# Patient Record
Sex: FEMALE | Race: WHITE | NOT HISPANIC OR LATINO | Employment: FULL TIME | URBAN - METROPOLITAN AREA
[De-identification: names, ages, dates, MRNs, and addresses within clinical notes are randomized per-mention and may not be internally consistent; named-entity substitution may affect disease eponyms.]

---

## 2020-09-01 ENCOUNTER — ROUTINE PRENATAL (OUTPATIENT)
Dept: OBSTETRICS AND GYNECOLOGY | Facility: CLINIC | Age: 35
End: 2020-09-01

## 2020-09-01 VITALS
WEIGHT: 146 LBS | DIASTOLIC BLOOD PRESSURE: 72 MMHG | HEIGHT: 65 IN | BODY MASS INDEX: 24.32 KG/M2 | SYSTOLIC BLOOD PRESSURE: 120 MMHG

## 2020-09-01 DIAGNOSIS — Z34.02 ENCOUNTER FOR SUPERVISION OF NORMAL FIRST PREGNANCY IN SECOND TRIMESTER: Primary | ICD-10-CM

## 2020-09-01 LAB
GLUCOSE UR STRIP-MCNC: NEGATIVE MG/DL
PROT UR STRIP-MCNC: NEGATIVE MG/DL

## 2020-09-01 PROCEDURE — 0502F SUBSEQUENT PRENATAL CARE: CPT | Performed by: NURSE PRACTITIONER

## 2020-09-01 RX ORDER — PRENATAL VIT/IRON FUM/FOLIC AC 27MG-0.8MG
TABLET ORAL DAILY
COMMUNITY

## 2020-09-01 NOTE — PROGRESS NOTES
"OB FOLLOW UP    Meaghan Erazo is a 34 y.o.  14w4d patient being seen today for her obstetrical follow up visit. Patient reports increased anxiety due to recent unexpected death of her father by heart attack. She does not want to take any anti anxiety medication now but may need something if continues to have increased anxiety.     Her prenatal care is complicated by (and status) : anxiety      ROS -      Fetal Movement : Too early  Vaginal bleeding denies  Cramping/Contractions denies   Leaking or Discharge denies  /72   Ht 165.1 cm (65\")   Wt 66.2 kg (146 lb)   LMP 2020   BMI 24.30 kg/m²     FHT:  160BPM    Uterine Size: Not checked   Presentations: unsure Checked : no   Pelvic Exam: Performed: No     Assessment    1. Pregnancy at 14w4d  2. Fetal status reassuring   3. Anxiety    Problem List Items Addressed This Visit     None      Visit Diagnoses     Encounter for supervision of normal first pregnancy in second trimester    -  Primary    Relevant Orders    POC Urinalysis Dipstick          Plan    1. Labor warnings   2. Reviewed routine prenatal care with the office and educational materials given  3. Follow up: 4 week(s)  4. May call for Rx buspar later for anxiety if continues    KARINA Vega  2020  "

## 2020-09-14 ENCOUNTER — TELEPHONE (OUTPATIENT)
Dept: OBSTETRICS AND GYNECOLOGY | Facility: CLINIC | Age: 35
End: 2020-09-14

## 2020-09-14 ENCOUNTER — ROUTINE PRENATAL (OUTPATIENT)
Dept: OBSTETRICS AND GYNECOLOGY | Facility: CLINIC | Age: 35
End: 2020-09-14

## 2020-09-14 VITALS — BODY MASS INDEX: 24.68 KG/M2 | DIASTOLIC BLOOD PRESSURE: 76 MMHG | WEIGHT: 148.3 LBS | SYSTOLIC BLOOD PRESSURE: 110 MMHG

## 2020-09-14 DIAGNOSIS — N93.9 VAGINAL SPOTTING: ICD-10-CM

## 2020-09-14 DIAGNOSIS — Z3A.16 16 WEEKS GESTATION OF PREGNANCY: Primary | ICD-10-CM

## 2020-09-14 LAB
GLUCOSE UR STRIP-MCNC: NEGATIVE MG/DL
PROT UR STRIP-MCNC: NEGATIVE MG/DL

## 2020-09-14 PROCEDURE — 0502F SUBSEQUENT PRENATAL CARE: CPT | Performed by: NURSE PRACTITIONER

## 2020-09-14 PROCEDURE — 96372 THER/PROPH/DIAG INJ SC/IM: CPT | Performed by: NURSE PRACTITIONER

## 2020-09-14 NOTE — TELEPHONE ENCOUNTER
Pt spoke with EMELI this weekend after having bright red bleeding after IC on Friday. She has continued to have spotting. A neg. She was told to come in today for US and Rhogam per EMELI.  Appt for US 12:45 and TH @ 1:15.

## 2020-09-14 NOTE — PROGRESS NOTES
"OB FOLLOW UP    Meaghan Erazo is a 34 y.o.  16w3d patient being seen today for her obstetrical follow up visit. Patient reports bleeding that started 2 days ago on Saturday night following IC. Bleeding was red at first but turned brown and she has not had any further bleeding since yesterday. She denies pain.    Her prenatal care is complicated by (and status) :    There is no problem list on file for this patient.      ROS -   Patient Reports : Vaginal Bleeding  Patient Denies:  Loss of Fluid, Vision Changes, Headaches and Cramping/Contractions   Fetal Movement : pt states \"flutters\"     LMP 2020     Ultrasound Today: yes    EXAM:  Vitals: See prenatal flowsheet   Abdomen: See prenatal flowsheet   Urine glucose/protein: See prenatal flowsheet   HRT: See prenatal flowsheet   Presentation: See prenatal flowsheet       Pelvic: Performed: No     Assessment    1. Pregnancy at 16w3d  2. Fetal status reassuring-U/S with FHR-160, placenta anterior, CL-45mm  3. RH negative- rhogam given today20     Problem List Items Addressed This Visit     None          Plan    1. Pelvic rest x 1 week post bleeding. Call for further BRB, abd pain.  2. Follow up: 3 week(s) for anatomy U/S      Kim Mason MA  2020  "

## 2020-09-14 NOTE — TELEPHONE ENCOUNTER
Meaghan called and said  called her over the weekend and told her she need to schedule a ultra sound today and a rolgam shot?

## 2020-10-06 ENCOUNTER — ROUTINE PRENATAL (OUTPATIENT)
Dept: OBSTETRICS AND GYNECOLOGY | Facility: CLINIC | Age: 35
End: 2020-10-06

## 2020-10-06 VITALS — SYSTOLIC BLOOD PRESSURE: 110 MMHG | DIASTOLIC BLOOD PRESSURE: 70 MMHG

## 2020-10-06 DIAGNOSIS — Z3A.19 19 WEEKS GESTATION OF PREGNANCY: Primary | ICD-10-CM

## 2020-10-06 LAB
GLUCOSE UR STRIP-MCNC: NEGATIVE MG/DL
PROT UR STRIP-MCNC: NEGATIVE MG/DL

## 2020-10-06 PROCEDURE — 0502F SUBSEQUENT PRENATAL CARE: CPT | Performed by: OBSTETRICS & GYNECOLOGY

## 2020-10-06 NOTE — PROGRESS NOTES
OB FOLLOW UP  CC- Here for care of pregnancy        Meaghan Erazo is a 34 y.o.  19w4d patient being seen today for her obstetrical follow up visit. Patient reports no complaints.     Her prenatal care is complicated by (and status) :    There is no problem list on file for this patient.      Flu Status: Will get at work/pharmacy/other facility     The additional following portions of the patient's history were reviewed and updated as appropriate: allergies, current medications, past family history, past medical history, past social history, past surgical history and problem list.    ROS -   Patient Reports : No Problems  Patient Denies: Loss of Fluid, Vaginal Spotting, Vision Changes, Headaches and Cramping/Contractions   Fetal Movement : normal  All other systems reviewed and are negative.     I have reviewed and agree with the HPI, ROS, and historical information as entered above. Jennifer Velasquez MA    LMP 2020     Ultrasound Today: yes    EXAM:   Vitals: See prenatal flowsheet   Abdomen: See prenatal flowsheet   Urine glucose/protein: See prenatal flowsheet   Pelvic: See prenatal flowsheet   HRT: See prenatal flowsheet   Presentation: See prenatal flowsheet   Movement: See prenatal flowsheet          Assessment and Plan    Problem List Items Addressed This Visit     None          1. Pregnancy at 19w4d  2. Fetal status reassuring.   3. Activity and Exercise discussed.  4. No follow-ups on file.  Breech presentation at 20 weeks scan, need another scan at 32 to 34 weeks  Jennifer Velasquez MA  10/06/2020

## 2020-11-03 ENCOUNTER — ROUTINE PRENATAL (OUTPATIENT)
Dept: OBSTETRICS AND GYNECOLOGY | Facility: CLINIC | Age: 35
End: 2020-11-03

## 2020-11-03 VITALS — SYSTOLIC BLOOD PRESSURE: 100 MMHG | DIASTOLIC BLOOD PRESSURE: 70 MMHG | WEIGHT: 158 LBS | BODY MASS INDEX: 26.29 KG/M2

## 2020-11-03 DIAGNOSIS — Z3A.23 23 WEEKS GESTATION OF PREGNANCY: Primary | ICD-10-CM

## 2020-11-03 LAB
GLUCOSE UR STRIP-MCNC: NEGATIVE MG/DL
PROT UR STRIP-MCNC: NEGATIVE MG/DL

## 2020-11-03 PROCEDURE — 0502F SUBSEQUENT PRENATAL CARE: CPT | Performed by: NURSE PRACTITIONER

## 2020-11-03 NOTE — PROGRESS NOTES
OB FOLLOW UP  CC- Here for care of pregnancy        Meaghan Erazo is a 35 y.o.  23w4d patient being seen today for her obstetrical follow up visit. Patient reports no complaints. 28wk instr given/A- RHOGAM. Feels well, good fm, no c/o. Got flu vac 2wks ago  Her prenatal care is complicated by (and status) :    There is no problem list on file for this patient.      Flu Status: Already given in current flu season    The additional following portions of the patient's history were reviewed and updated as appropriate: allergies, current medications, past family history, past medical history, past social history, past surgical history and problem list.    ROS -   Patient Reports : No Problems  Patient Denies: Loss of Fluid, Vaginal Spotting, Vision Changes, Headaches, Nausea  and Vomiting   Fetal Movement : normal  All other systems reviewed and are negative.     I have reviewed and agree with the HPI, ROS, and historical information as entered above. Savannah Nelson, KARINA    /70   Wt 71.7 kg (158 lb)   LMP 2020   BMI 26.29 kg/m²     Ultrasound Today: No.    EXAM:     FHT: 140 BPM   Uterine Size: 23 cm  Pelvic Exam: not performed    Urine glucose/protein: See prenatal flowsheet       Assessment and Plan    Problem List Items Addressed This Visit     None      Visit Diagnoses     23 weeks gestation of pregnancy    -  Primary    Relevant Orders    POC Urinalysis Dipstick, Automated (Completed)          1. Pregnancy at 23w4d  2. Fetal status reassuring.   3. Activity and Exercise discussed.  28 week labs next visit, Will need Rhogam then for Rh neg blood type    KARINA eVga  2020

## 2020-12-01 ENCOUNTER — ROUTINE PRENATAL (OUTPATIENT)
Dept: OBSTETRICS AND GYNECOLOGY | Facility: CLINIC | Age: 35
End: 2020-12-01

## 2020-12-01 ENCOUNTER — RESULTS ENCOUNTER (OUTPATIENT)
Dept: OBSTETRICS AND GYNECOLOGY | Facility: CLINIC | Age: 35
End: 2020-12-01

## 2020-12-01 VITALS — SYSTOLIC BLOOD PRESSURE: 110 MMHG | BODY MASS INDEX: 27.12 KG/M2 | WEIGHT: 163 LBS | DIASTOLIC BLOOD PRESSURE: 72 MMHG

## 2020-12-01 DIAGNOSIS — Z3A.27 27 WEEKS GESTATION OF PREGNANCY: ICD-10-CM

## 2020-12-01 DIAGNOSIS — Z3A.27 27 WEEKS GESTATION OF PREGNANCY: Primary | ICD-10-CM

## 2020-12-01 LAB
GLUCOSE UR STRIP-MCNC: NEGATIVE MG/DL
PROT UR STRIP-MCNC: NEGATIVE MG/DL

## 2020-12-01 PROCEDURE — 96372 THER/PROPH/DIAG INJ SC/IM: CPT | Performed by: OBSTETRICS & GYNECOLOGY

## 2020-12-01 PROCEDURE — 0502F SUBSEQUENT PRENATAL CARE: CPT | Performed by: OBSTETRICS & GYNECOLOGY

## 2020-12-01 NOTE — PROGRESS NOTES
OB FOLLOW UP    Meaghan Erazo is a 35 y.o.  27w4d patient being seen today for her obstetrical follow up visit. Patient reports no complaints.     Her prenatal care is complicated by (and status) : Rh negative    ROS -   Contractions none   problems none  GI problems mild heartburn  Bleeding or Leaking NONE  Fetal Movement :  GFM    28 wk packet reviewed  Rhogam today      Current Outpatient Medications:   •  Prenatal Vit-Fe Fumarate-FA (PRENATAL VITAMIN 27-0.8) 27-0.8 MG tablet tablet, Take  by mouth Daily., Disp: , Rfl:     /72   Wt 73.9 kg (163 lb)   LMP 2020   BMI 27.12 kg/m²     FHT:  150 BPM    Uterine Size: size equals dates   Presentations: unsure        Uterus-nontender   Assessment    1. Pregnancy at 27w4d  2. Fetal status reassuring     Problem List Items Addressed This Visit     None      Visit Diagnoses     27 weeks gestation of pregnancy    -  Primary    Relevant Orders    Glucose, Post 50 Gm Glucola    CBC & Differential    Antibody Screen    POC Urinalysis Dipstick (Completed)          Plan    1. P/patient return in 4 weeks  2. Prenatal teaching done and patient questions were answered  3. Ultrasound at 34 weeks    Carlos Lund MD  2020

## 2020-12-03 LAB
BASOPHILS # BLD AUTO: 0 X10E3/UL (ref 0–0.2)
BASOPHILS NFR BLD AUTO: 0 %
BLD GP AB SCN SERPL QL: NORMAL
BLD GP AB SCN SERPL QL: POSITIVE
BLOOD GROUP ANTIBODIES SERPL: ABNORMAL
EOSINOPHIL # BLD AUTO: 0.1 X10E3/UL (ref 0–0.4)
EOSINOPHIL NFR BLD AUTO: 1 %
ERYTHROCYTE [DISTWIDTH] IN BLOOD BY AUTOMATED COUNT: 11.9 % (ref 11.7–15.4)
GLUCOSE 1H P 50 G GLC PO SERPL-MCNC: 107 MG/DL (ref 65–139)
HCT VFR BLD AUTO: 32.9 % (ref 34–46.6)
HGB BLD-MCNC: 11.2 G/DL (ref 11.1–15.9)
IMM GRANULOCYTES # BLD AUTO: 0.1 X10E3/UL (ref 0–0.1)
IMM GRANULOCYTES NFR BLD AUTO: 1 %
LYMPHOCYTES # BLD AUTO: 1.3 X10E3/UL (ref 0.7–3.1)
LYMPHOCYTES NFR BLD AUTO: 16 %
Lab: NORMAL
MCH RBC QN AUTO: 32.3 PG (ref 26.6–33)
MCHC RBC AUTO-ENTMCNC: 34 G/DL (ref 31.5–35.7)
MCV RBC AUTO: 95 FL (ref 79–97)
MONOCYTES # BLD AUTO: 0.7 X10E3/UL (ref 0.1–0.9)
MONOCYTES NFR BLD AUTO: 8 %
NEUTROPHILS # BLD AUTO: 6 X10E3/UL (ref 1.4–7)
NEUTROPHILS NFR BLD AUTO: 74 %
PLATELET # BLD AUTO: 260 X10E3/UL (ref 150–450)
RBC # BLD AUTO: 3.47 X10E6/UL (ref 3.77–5.28)
WBC # BLD AUTO: 8.1 X10E3/UL (ref 3.4–10.8)
WRITTEN AUTHORIZATION: NORMAL
XXX BLOOD GROUP AB TITR SERPL AHG: ABNORMAL {TITER}

## 2020-12-29 ENCOUNTER — ROUTINE PRENATAL (OUTPATIENT)
Dept: OBSTETRICS AND GYNECOLOGY | Facility: CLINIC | Age: 35
End: 2020-12-29

## 2020-12-29 VITALS — DIASTOLIC BLOOD PRESSURE: 70 MMHG | BODY MASS INDEX: 27.46 KG/M2 | SYSTOLIC BLOOD PRESSURE: 110 MMHG | WEIGHT: 165 LBS

## 2020-12-29 DIAGNOSIS — Z3A.31 31 WEEKS GESTATION OF PREGNANCY: Primary | ICD-10-CM

## 2020-12-29 LAB
GLUCOSE UR STRIP-MCNC: ABNORMAL MG/DL
PROT UR STRIP-MCNC: NEGATIVE MG/DL

## 2020-12-29 PROCEDURE — 0502F SUBSEQUENT PRENATAL CARE: CPT | Performed by: NURSE PRACTITIONER

## 2020-12-29 NOTE — PROGRESS NOTES
OB FOLLOW UP    Meaghan Erazo is a 35 y.o.  31w4d patient being seen today for her obstetrical follow up visit. Patient reports no complaints. Pt is having some pelvic pressure. Good fm, no ctx    Her prenatal care is complicated by (and status) :    There is no problem list on file for this patient.      ROS -   Patient Reports : No Problems  Patient Denies: Loss of Fluid, Vaginal Spotting, Vision Changes, Headaches and Cramping/Contractions   Fetal Movement : normal      /70   Wt 74.8 kg (165 lb)   LMP 2020   BMI 27.46 kg/m²     Ultrasound Today: no    EXAM:  Vitals: See prenatal flowsheet   Abdomen: Non tender   Urine glucose/protein: Negative/trace   Pelvic: Not performed     Assessment    1. Pregnancy at 31w4d  2. Fetal status reassuring   3. Size greater than dates    Problem List Items Addressed This Visit     None      Visit Diagnoses     31 weeks gestation of pregnancy    -  Primary    Relevant Orders    POC Urinalysis Dipstick, Automated (Completed)          Plan    1. Fetal movement/ Labor Precautions  2. Size > dates  3. Growth scan in 2 weeks. ( had already recommended this for next visit.       Savannah Nelson, APRN  2020

## 2021-01-12 ENCOUNTER — ROUTINE PRENATAL (OUTPATIENT)
Dept: OBSTETRICS AND GYNECOLOGY | Facility: CLINIC | Age: 36
End: 2021-01-12

## 2021-01-12 VITALS — WEIGHT: 169 LBS | SYSTOLIC BLOOD PRESSURE: 102 MMHG | DIASTOLIC BLOOD PRESSURE: 66 MMHG | BODY MASS INDEX: 28.12 KG/M2

## 2021-01-12 DIAGNOSIS — Z3A.33 33 WEEKS GESTATION OF PREGNANCY: Primary | ICD-10-CM

## 2021-01-12 LAB
EXPIRATION DATE: NORMAL
GLUCOSE UR STRIP-MCNC: NEGATIVE MG/DL
Lab: NORMAL
PROT UR STRIP-MCNC: NEGATIVE MG/DL

## 2021-01-12 PROCEDURE — 0502F SUBSEQUENT PRENATAL CARE: CPT | Performed by: OBSTETRICS & GYNECOLOGY

## 2021-01-12 NOTE — PROGRESS NOTES
OB FOLLOW UP    Meaghan Erazo is a 35 y.o.  33w4d patient being seen today for her obstetrical follow up visit. Patient reports no bleeding, no contractions, no cramping, no leaking and occasional headaches.     Her prenatal care is complicated by (and status) : none    ROS -   Contractions: Negative   problems: Negative  GI problems: Negative  Bleeding or Leaking: Negative  Fetal Movement : Positive      Current Outpatient Medications:   •  Prenatal Vit-Fe Fumarate-FA (PRENATAL VITAMIN 27-0.8) 27-0.8 MG tablet tablet, Take  by mouth Daily., Disp: , Rfl:     LMP 2020     FHT:  150 BPM    Uterine Size: size equals dates   Presentations: unsure        Uterus-nontender   Assessment    1. Pregnancy at 33w4d  2. Fetal status reassuring     Problem List Items Addressed This Visit     None          Plan    1. P/patient return in 1 weeks  2. Prenatal teaching done and patient questions were answered  3. Unsure position ultrasound for presentation and fetal weight next week    Staci Oshea RN  2021

## 2021-01-20 ENCOUNTER — ROUTINE PRENATAL (OUTPATIENT)
Dept: OBSTETRICS AND GYNECOLOGY | Facility: CLINIC | Age: 36
End: 2021-01-20

## 2021-01-20 VITALS — DIASTOLIC BLOOD PRESSURE: 78 MMHG | SYSTOLIC BLOOD PRESSURE: 122 MMHG | BODY MASS INDEX: 28.12 KG/M2 | WEIGHT: 169 LBS

## 2021-01-20 DIAGNOSIS — Z3A.34 34 WEEKS GESTATION OF PREGNANCY: Primary | ICD-10-CM

## 2021-01-20 DIAGNOSIS — Z3A.34 34 WEEKS GESTATION OF PREGNANCY: ICD-10-CM

## 2021-01-20 PROCEDURE — 0502F SUBSEQUENT PRENATAL CARE: CPT | Performed by: OBSTETRICS & GYNECOLOGY

## 2021-01-20 PROCEDURE — 76816 OB US FOLLOW-UP PER FETUS: CPT | Performed by: OBSTETRICS & GYNECOLOGY

## 2021-01-20 NOTE — PROGRESS NOTES
OB FOLLOW UP    Meaghan Erazo is a 35 y.o.  34w5d patient being seen today for her obstetrical follow up visit. Patient reports no bleeding, no leaking and occasional contractions.      Her prenatal care is complicated by (and status) : None    ROS -   Contractions: Ocassional   problems: Denies  GI problems: Denies  Bleeding or Leaking: Denies  Fetal Movement: Positive      Current Outpatient Medications:   •  Prenatal Vit-Fe Fumarate-FA (PRENATAL VITAMIN 27-0.8) 27-0.8 MG tablet tablet, Take  by mouth Daily., Disp: , Rfl:     /78   Wt 76.7 kg (169 lb)   LMP 2020   BMI 28.12 kg/m²     FHT:  140 BPM    Uterine Size: size equals dates   Presentations: cephalic        Uterus-nontender   Assessment    1. Pregnancy at 34w5d  2. Fetal status reassuring     Problem List Items Addressed This Visit        Other    34 weeks gestation of pregnancy    Relevant Orders    POC Glucose, Urine, Qualitative, Dipstick (Completed)    POC Protein, Urine, Qualitative, Dipstick (Completed)          Plan    1. P/patient return in 2 weeks  2. Prenatal teaching done and patient questions were answered  3. Ultrasound today within normal limits 40th percentile cephalic presentation anticipate vaginal delivery    Carlos Lund MD  2021

## 2021-02-02 ENCOUNTER — LAB (OUTPATIENT)
Dept: LAB | Facility: HOSPITAL | Age: 36
End: 2021-02-02

## 2021-02-02 ENCOUNTER — ROUTINE PRENATAL (OUTPATIENT)
Dept: OBSTETRICS AND GYNECOLOGY | Facility: CLINIC | Age: 36
End: 2021-02-02

## 2021-02-02 VITALS — WEIGHT: 174 LBS | BODY MASS INDEX: 28.96 KG/M2 | DIASTOLIC BLOOD PRESSURE: 74 MMHG | SYSTOLIC BLOOD PRESSURE: 118 MMHG

## 2021-02-02 DIAGNOSIS — Z3A.36 36 WEEKS GESTATION OF PREGNANCY: Primary | ICD-10-CM

## 2021-02-02 DIAGNOSIS — Z87.51 HISTORY OF PREMATURE DELIVERY: ICD-10-CM

## 2021-02-02 DIAGNOSIS — Z3A.34 34 WEEKS GESTATION OF PREGNANCY: Primary | ICD-10-CM

## 2021-02-02 PROCEDURE — 87081 CULTURE SCREEN ONLY: CPT

## 2021-02-02 PROCEDURE — 0502F SUBSEQUENT PRENATAL CARE: CPT | Performed by: OBSTETRICS & GYNECOLOGY

## 2021-02-02 NOTE — PROGRESS NOTES
OB FOLLOW UP    Meaghan Erazo is a 35 y.o.  36w4d patient being seen today for her obstetrical follow up visit. Patient reports no bleeding, no cramping and no leaking. GBS today.    Her prenatal care is complicated by (and status) : None    ROS -   Contractions: Occasional. Has been having some tightening and sharp pain in her upper abdomen.   problems: Denies  GI problems: Denies  Bleeding or Leaking: Denies  Fetal Movement : Normal movement       Current Outpatient Medications:   •  Prenatal Vit-Fe Fumarate-FA (PRENATAL VITAMIN 27-0.8) 27-0.8 MG tablet tablet, Take  by mouth Daily., Disp: , Rfl:     /74   Wt 78.9 kg (174 lb)   LMP 2020   BMI 28.96 kg/m²     FHT:  140 BPM    Uterine Size: size equals dates   Presentations: cephalic        Uterus-nontender   Assessment    1. Pregnancy at 36w4d  2. Fetal status reassuring     Problem List Items Addressed This Visit        Other    History of premature delivery      Other Visit Diagnoses     36 weeks gestation of pregnancy    -  Primary    Relevant Orders    POC Glucose, Urine, Qualitative, Dipstick (Completed)    POC Protein, Urine, Qualitative, Dipstick (Completed)    Group B Streptococcus Culture - Swab, Vaginal/Rectum        260/-2 cephalic presentation  Plan    1. P/patient return in 1 week weeks  2. Prenatal teaching done and patient questions were answered  3. Return to clinic weekly    Carlos Lund MD  2021

## 2021-02-05 LAB — BACTERIA SPEC AEROBE CULT: NORMAL

## 2021-02-09 ENCOUNTER — ROUTINE PRENATAL (OUTPATIENT)
Dept: OBSTETRICS AND GYNECOLOGY | Facility: CLINIC | Age: 36
End: 2021-02-09

## 2021-02-09 VITALS — SYSTOLIC BLOOD PRESSURE: 118 MMHG | DIASTOLIC BLOOD PRESSURE: 78 MMHG | WEIGHT: 171 LBS | BODY MASS INDEX: 28.46 KG/M2

## 2021-02-09 DIAGNOSIS — Z3A.37 37 WEEKS GESTATION OF PREGNANCY: Primary | ICD-10-CM

## 2021-02-09 PROCEDURE — 0502F SUBSEQUENT PRENATAL CARE: CPT | Performed by: OBSTETRICS & GYNECOLOGY

## 2021-02-09 NOTE — PROGRESS NOTES
OB FOLLOW UP    Meaghan Erazo is a 35 y.o.  37w4d patient being seen today for her obstetrical follow up visit. Patient reports no bleeding, no cramping, no leaking and occasional contractions.  GBS negative, reviewed with pt.    Her prenatal care is complicated by (and status) : None    ROS -   Contractions: Occasional. Was having lots of ctx on Sat but drank more water and laid on left side and ctx stopped.    problems: Denies  GI problems: Denies  Bleeding or Leaking: Denies  Fetal Movement : Positive      Current Outpatient Medications:   •  Prenatal Vit-Fe Fumarate-FA (PRENATAL VITAMIN 27-0.8) 27-0.8 MG tablet tablet, Take  by mouth Daily., Disp: , Rfl:     /78   Wt 77.6 kg (171 lb)   LMP 2020   BMI 28.46 kg/m²     FHT:  150 BPM    Uterine Size: size equals dates   Presentations: cephalic        Uterus-nontender   Assessment    1. Pregnancy at 37w4d  2. Fetal status reassuring     Problem List Items Addressed This Visit     None      Visit Diagnoses     37 weeks gestation of pregnancy    -  Primary    Relevant Orders    POC Glucose, Urine, Qualitative, Dipstick (Completed)    POC Protein, Urine, Qualitative, Dipstick (Completed)          Plan    1. P/patient return in 1 weeks  2. Prenatal teaching done and patient questions were answered  3. Patient wants to avoid induction    Carlos Lund MD  2021

## 2021-02-12 ENCOUNTER — ANESTHESIA (OUTPATIENT)
Dept: LABOR AND DELIVERY | Facility: HOSPITAL | Age: 36
End: 2021-02-12

## 2021-02-12 ENCOUNTER — ANESTHESIA EVENT (OUTPATIENT)
Dept: LABOR AND DELIVERY | Facility: HOSPITAL | Age: 36
End: 2021-02-12

## 2021-02-12 ENCOUNTER — HOSPITAL ENCOUNTER (INPATIENT)
Facility: HOSPITAL | Age: 36
LOS: 2 days | Discharge: HOME | End: 2021-02-14
Attending: OBSTETRICS & GYNECOLOGY | Admitting: OBSTETRICS & GYNECOLOGY

## 2021-02-12 PROBLEM — Z34.90 PREGNANCY: Status: ACTIVE | Noted: 2021-02-12

## 2021-02-12 LAB
ABO GROUP BLD: NORMAL
ABO GROUP BLD: NORMAL
ANTI-D, PASSIVE: NORMAL
BLD GP AB SCN SERPL QL: POSITIVE
DEPRECATED RDW RBC AUTO: 44.6 FL (ref 37–54)
ERYTHROCYTE [DISTWIDTH] IN BLOOD BY AUTOMATED COUNT: 13 % (ref 12.3–15.4)
HCT VFR BLD AUTO: 35.7 % (ref 34–46.6)
HGB BLD-MCNC: 11.5 G/DL (ref 12–15.9)
MCH RBC QN AUTO: 30 PG (ref 26.6–33)
MCHC RBC AUTO-ENTMCNC: 32.2 G/DL (ref 31.5–35.7)
MCV RBC AUTO: 93.2 FL (ref 79–97)
PLATELET # BLD AUTO: 269 10*3/MM3 (ref 140–450)
PMV BLD AUTO: 10 FL (ref 6–12)
RBC # BLD AUTO: 3.83 10*6/MM3 (ref 3.77–5.28)
RH BLD: NEGATIVE
RH BLD: NEGATIVE
SARS-COV-2 RDRP RESP QL NAA+PROBE: NORMAL
T&S EXPIRATION DATE: NORMAL
WBC # BLD AUTO: 10.47 10*3/MM3 (ref 3.4–10.8)

## 2021-02-12 PROCEDURE — C1755 CATHETER, INTRASPINAL: HCPCS

## 2021-02-12 PROCEDURE — 36415 COLL VENOUS BLD VENIPUNCTURE: CPT | Performed by: OBSTETRICS & GYNECOLOGY

## 2021-02-12 PROCEDURE — 86901 BLOOD TYPING SEROLOGIC RH(D): CPT | Performed by: OBSTETRICS & GYNECOLOGY

## 2021-02-12 PROCEDURE — C1755 CATHETER, INTRASPINAL: HCPCS | Performed by: ANESTHESIOLOGY

## 2021-02-12 PROCEDURE — 4A1HX4Z MONITORING OF PRODUCTS OF CONCEPTION, CARDIAC ELECTRICAL ACTIVITY, EXTERNAL APPROACH: ICD-10-PCS | Performed by: OBSTETRICS & GYNECOLOGY

## 2021-02-12 PROCEDURE — 59410 OBSTETRICAL CARE: CPT | Performed by: OBSTETRICS & GYNECOLOGY

## 2021-02-12 PROCEDURE — 25010000002 FENTANYL CITRATE (PF) 100 MCG/2ML SOLUTION: Performed by: ANESTHESIOLOGY

## 2021-02-12 PROCEDURE — 86901 BLOOD TYPING SEROLOGIC RH(D): CPT

## 2021-02-12 PROCEDURE — 86900 BLOOD TYPING SEROLOGIC ABO: CPT | Performed by: OBSTETRICS & GYNECOLOGY

## 2021-02-12 PROCEDURE — 59025 FETAL NON-STRESS TEST: CPT

## 2021-02-12 PROCEDURE — 85027 COMPLETE CBC AUTOMATED: CPT | Performed by: OBSTETRICS & GYNECOLOGY

## 2021-02-12 PROCEDURE — 10907ZC DRAINAGE OF AMNIOTIC FLUID, THERAPEUTIC FROM PRODUCTS OF CONCEPTION, VIA NATURAL OR ARTIFICIAL OPENING: ICD-10-PCS | Performed by: OBSTETRICS & GYNECOLOGY

## 2021-02-12 PROCEDURE — 86870 RBC ANTIBODY IDENTIFICATION: CPT | Performed by: OBSTETRICS & GYNECOLOGY

## 2021-02-12 PROCEDURE — 0KQM0ZZ REPAIR PERINEUM MUSCLE, OPEN APPROACH: ICD-10-PCS | Performed by: OBSTETRICS & GYNECOLOGY

## 2021-02-12 PROCEDURE — 25010000002 ROPIVACAINE PER 1 MG: Performed by: ANESTHESIOLOGY

## 2021-02-12 PROCEDURE — 87635 SARS-COV-2 COVID-19 AMP PRB: CPT | Performed by: OBSTETRICS & GYNECOLOGY

## 2021-02-12 PROCEDURE — 86900 BLOOD TYPING SEROLOGIC ABO: CPT

## 2021-02-12 PROCEDURE — 51703 INSERT BLADDER CATH COMPLEX: CPT

## 2021-02-12 PROCEDURE — S0260 H&P FOR SURGERY: HCPCS | Performed by: OBSTETRICS & GYNECOLOGY

## 2021-02-12 PROCEDURE — 86850 RBC ANTIBODY SCREEN: CPT | Performed by: OBSTETRICS & GYNECOLOGY

## 2021-02-12 RX ORDER — SODIUM CHLORIDE, SODIUM LACTATE, POTASSIUM CHLORIDE, CALCIUM CHLORIDE 600; 310; 30; 20 MG/100ML; MG/100ML; MG/100ML; MG/100ML
125 INJECTION, SOLUTION INTRAVENOUS CONTINUOUS
Status: DISCONTINUED | OUTPATIENT
Start: 2021-02-12 | End: 2021-02-14 | Stop reason: HOSPADM

## 2021-02-12 RX ORDER — OXYTOCIN-SODIUM CHLORIDE 0.9% IV SOLN 30 UNIT/500ML 30-0.9/5 UT/ML-%
650 SOLUTION INTRAVENOUS ONCE
Status: DISCONTINUED | OUTPATIENT
Start: 2021-02-12 | End: 2021-02-13 | Stop reason: HOSPADM

## 2021-02-12 RX ORDER — METHYLERGONOVINE MALEATE 0.2 MG/ML
200 INJECTION INTRAVENOUS ONCE AS NEEDED
Status: DISCONTINUED | OUTPATIENT
Start: 2021-02-12 | End: 2021-02-13 | Stop reason: HOSPADM

## 2021-02-12 RX ORDER — EPHEDRINE SULFATE 5 MG/ML
10 INJECTION INTRAVENOUS
Status: DISCONTINUED | OUTPATIENT
Start: 2021-02-12 | End: 2021-02-13 | Stop reason: HOSPADM

## 2021-02-12 RX ORDER — ROPIVACAINE HYDROCHLORIDE 2 MG/ML
15 INJECTION, SOLUTION EPIDURAL; INFILTRATION; PERINEURAL CONTINUOUS
Status: DISCONTINUED | OUTPATIENT
Start: 2021-02-12 | End: 2021-02-14 | Stop reason: HOSPADM

## 2021-02-12 RX ORDER — MISOPROSTOL 200 UG/1
800 TABLET ORAL AS NEEDED
Status: DISCONTINUED | OUTPATIENT
Start: 2021-02-12 | End: 2021-02-13 | Stop reason: HOSPADM

## 2021-02-12 RX ORDER — PROMETHAZINE HYDROCHLORIDE 12.5 MG/1
12.5 SUPPOSITORY RECTAL EVERY 6 HOURS PRN
Status: DISCONTINUED | OUTPATIENT
Start: 2021-02-12 | End: 2021-02-12 | Stop reason: SDUPTHER

## 2021-02-12 RX ORDER — ONDANSETRON 4 MG/1
4 TABLET, FILM COATED ORAL EVERY 6 HOURS PRN
Status: DISCONTINUED | OUTPATIENT
Start: 2021-02-12 | End: 2021-02-12 | Stop reason: SDUPTHER

## 2021-02-12 RX ORDER — LIDOCAINE HYDROCHLORIDE 10 MG/ML
5 INJECTION, SOLUTION EPIDURAL; INFILTRATION; INTRACAUDAL; PERINEURAL AS NEEDED
Status: DISCONTINUED | OUTPATIENT
Start: 2021-02-12 | End: 2021-02-13 | Stop reason: HOSPADM

## 2021-02-12 RX ORDER — PROMETHAZINE HYDROCHLORIDE 12.5 MG/1
12.5 SUPPOSITORY RECTAL EVERY 6 HOURS PRN
Status: DISCONTINUED | OUTPATIENT
Start: 2021-02-12 | End: 2021-02-13 | Stop reason: HOSPADM

## 2021-02-12 RX ORDER — CARBOPROST TROMETHAMINE 250 UG/ML
250 INJECTION, SOLUTION INTRAMUSCULAR AS NEEDED
Status: DISCONTINUED | OUTPATIENT
Start: 2021-02-12 | End: 2021-02-13 | Stop reason: HOSPADM

## 2021-02-12 RX ORDER — OXYTOCIN-SODIUM CHLORIDE 0.9% IV SOLN 30 UNIT/500ML 30-0.9/5 UT/ML-%
2-20 SOLUTION INTRAVENOUS
Status: DISCONTINUED | OUTPATIENT
Start: 2021-02-12 | End: 2021-02-14 | Stop reason: HOSPADM

## 2021-02-12 RX ORDER — FENTANYL CITRATE 50 UG/ML
INJECTION, SOLUTION INTRAMUSCULAR; INTRAVENOUS AS NEEDED
Status: DISCONTINUED | OUTPATIENT
Start: 2021-02-12 | End: 2021-02-12 | Stop reason: SURG

## 2021-02-12 RX ORDER — ACETAMINOPHEN 325 MG/1
650 TABLET ORAL EVERY 4 HOURS PRN
Status: DISCONTINUED | OUTPATIENT
Start: 2021-02-12 | End: 2021-02-12 | Stop reason: SDUPTHER

## 2021-02-12 RX ORDER — LIDOCAINE HYDROCHLORIDE AND EPINEPHRINE 15; 5 MG/ML; UG/ML
INJECTION, SOLUTION EPIDURAL AS NEEDED
Status: DISCONTINUED | OUTPATIENT
Start: 2021-02-12 | End: 2021-02-12 | Stop reason: SURG

## 2021-02-12 RX ORDER — ACETAMINOPHEN 325 MG/1
650 TABLET ORAL EVERY 4 HOURS PRN
Status: DISCONTINUED | OUTPATIENT
Start: 2021-02-12 | End: 2021-02-13 | Stop reason: HOSPADM

## 2021-02-12 RX ORDER — ONDANSETRON 2 MG/ML
4 INJECTION INTRAMUSCULAR; INTRAVENOUS EVERY 6 HOURS PRN
Status: DISCONTINUED | OUTPATIENT
Start: 2021-02-12 | End: 2021-02-12 | Stop reason: SDUPTHER

## 2021-02-12 RX ORDER — TRISODIUM CITRATE DIHYDRATE AND CITRIC ACID MONOHYDRATE 500; 334 MG/5ML; MG/5ML
30 SOLUTION ORAL ONCE AS NEEDED
Status: DISCONTINUED | OUTPATIENT
Start: 2021-02-12 | End: 2021-02-13 | Stop reason: HOSPADM

## 2021-02-12 RX ORDER — IBUPROFEN 600 MG/1
600 TABLET ORAL EVERY 6 HOURS PRN
Status: DISCONTINUED | OUTPATIENT
Start: 2021-02-12 | End: 2021-02-13 | Stop reason: HOSPADM

## 2021-02-12 RX ORDER — ONDANSETRON 2 MG/ML
4 INJECTION INTRAMUSCULAR; INTRAVENOUS ONCE AS NEEDED
Status: DISCONTINUED | OUTPATIENT
Start: 2021-02-12 | End: 2021-02-13 | Stop reason: HOSPADM

## 2021-02-12 RX ORDER — SODIUM CHLORIDE 0.9 % (FLUSH) 0.9 %
3 SYRINGE (ML) INJECTION EVERY 12 HOURS SCHEDULED
Status: DISCONTINUED | OUTPATIENT
Start: 2021-02-12 | End: 2021-02-13 | Stop reason: HOSPADM

## 2021-02-12 RX ORDER — PROMETHAZINE HYDROCHLORIDE 12.5 MG/1
12.5 TABLET ORAL EVERY 6 HOURS PRN
Status: DISCONTINUED | OUTPATIENT
Start: 2021-02-12 | End: 2021-02-13 | Stop reason: HOSPADM

## 2021-02-12 RX ORDER — SODIUM CHLORIDE 0.9 % (FLUSH) 0.9 %
1-10 SYRINGE (ML) INJECTION AS NEEDED
Status: DISCONTINUED | OUTPATIENT
Start: 2021-02-12 | End: 2021-02-13 | Stop reason: HOSPADM

## 2021-02-12 RX ORDER — PROMETHAZINE HYDROCHLORIDE 12.5 MG/1
12.5 TABLET ORAL EVERY 6 HOURS PRN
Status: DISCONTINUED | OUTPATIENT
Start: 2021-02-12 | End: 2021-02-12 | Stop reason: SDUPTHER

## 2021-02-12 RX ORDER — HYDROCODONE BITARTRATE AND ACETAMINOPHEN 5; 325 MG/1; MG/1
1 TABLET ORAL EVERY 4 HOURS PRN
Status: DISCONTINUED | OUTPATIENT
Start: 2021-02-12 | End: 2021-02-13 | Stop reason: HOSPADM

## 2021-02-12 RX ORDER — BUTORPHANOL TARTRATE 1 MG/ML
1 INJECTION, SOLUTION INTRAMUSCULAR; INTRAVENOUS
Status: DISCONTINUED | OUTPATIENT
Start: 2021-02-12 | End: 2021-02-13 | Stop reason: HOSPADM

## 2021-02-12 RX ORDER — DIPHENHYDRAMINE HYDROCHLORIDE 50 MG/ML
12.5 INJECTION INTRAMUSCULAR; INTRAVENOUS EVERY 8 HOURS PRN
Status: DISCONTINUED | OUTPATIENT
Start: 2021-02-12 | End: 2021-02-13 | Stop reason: HOSPADM

## 2021-02-12 RX ORDER — METOCLOPRAMIDE HYDROCHLORIDE 5 MG/ML
10 INJECTION INTRAMUSCULAR; INTRAVENOUS ONCE AS NEEDED
Status: DISCONTINUED | OUTPATIENT
Start: 2021-02-12 | End: 2021-02-13 | Stop reason: HOSPADM

## 2021-02-12 RX ORDER — OXYTOCIN-SODIUM CHLORIDE 0.9% IV SOLN 30 UNIT/500ML 30-0.9/5 UT/ML-%
85 SOLUTION INTRAVENOUS ONCE
Status: COMPLETED | OUTPATIENT
Start: 2021-02-13 | End: 2021-02-13

## 2021-02-12 RX ORDER — ONDANSETRON 2 MG/ML
4 INJECTION INTRAMUSCULAR; INTRAVENOUS EVERY 6 HOURS PRN
Status: DISCONTINUED | OUTPATIENT
Start: 2021-02-12 | End: 2021-02-13 | Stop reason: HOSPADM

## 2021-02-12 RX ORDER — TRISODIUM CITRATE DIHYDRATE AND CITRIC ACID MONOHYDRATE 500; 334 MG/5ML; MG/5ML
30 SOLUTION ORAL ONCE
Status: DISCONTINUED | OUTPATIENT
Start: 2021-02-12 | End: 2021-02-13 | Stop reason: HOSPADM

## 2021-02-12 RX ORDER — TERBUTALINE SULFATE 1 MG/ML
0.25 INJECTION, SOLUTION SUBCUTANEOUS AS NEEDED
Status: DISCONTINUED | OUTPATIENT
Start: 2021-02-12 | End: 2021-02-13 | Stop reason: HOSPADM

## 2021-02-12 RX ORDER — MAGNESIUM CARB/ALUMINUM HYDROX 105-160MG
30 TABLET,CHEWABLE ORAL ONCE
Status: DISCONTINUED | OUTPATIENT
Start: 2021-02-12 | End: 2021-02-13 | Stop reason: HOSPADM

## 2021-02-12 RX ORDER — FAMOTIDINE 10 MG/ML
20 INJECTION, SOLUTION INTRAVENOUS ONCE AS NEEDED
Status: DISCONTINUED | OUTPATIENT
Start: 2021-02-12 | End: 2021-02-13 | Stop reason: HOSPADM

## 2021-02-12 RX ORDER — ONDANSETRON 4 MG/1
4 TABLET, FILM COATED ORAL EVERY 6 HOURS PRN
Status: DISCONTINUED | OUTPATIENT
Start: 2021-02-12 | End: 2021-02-13 | Stop reason: HOSPADM

## 2021-02-12 RX ADMIN — OXYTOCIN 2 MILLI-UNITS/MIN: 10 INJECTION INTRAVENOUS at 20:55

## 2021-02-12 RX ADMIN — ROPIVACAINE HYDROCHLORIDE 15 ML/HR: 2 INJECTION, SOLUTION EPIDURAL; INFILTRATION at 18:35

## 2021-02-12 RX ADMIN — LIDOCAINE HYDROCHLORIDE AND EPINEPHRINE 3 ML: 15; 5 INJECTION, SOLUTION EPIDURAL at 18:26

## 2021-02-12 RX ADMIN — SODIUM CHLORIDE, POTASSIUM CHLORIDE, SODIUM LACTATE AND CALCIUM CHLORIDE 1000 ML: 600; 310; 30; 20 INJECTION, SOLUTION INTRAVENOUS at 18:09

## 2021-02-12 RX ADMIN — LIDOCAINE HYDROCHLORIDE AND EPINEPHRINE 2 ML: 15; 5 INJECTION, SOLUTION EPIDURAL at 18:28

## 2021-02-12 RX ADMIN — FENTANYL CITRATE 100 MCG: 50 INJECTION, SOLUTION INTRAMUSCULAR; INTRAVENOUS at 18:33

## 2021-02-12 RX ADMIN — ROPIVACAINE HYDROCHLORIDE 12 ML: 5 INJECTION, SOLUTION EPIDURAL; INFILTRATION; PERINEURAL at 18:30

## 2021-02-12 RX ADMIN — SODIUM CHLORIDE, POTASSIUM CHLORIDE, SODIUM LACTATE AND CALCIUM CHLORIDE 125 ML/HR: 600; 310; 30; 20 INJECTION, SOLUTION INTRAVENOUS at 13:10

## 2021-02-12 RX ADMIN — SODIUM CHLORIDE, POTASSIUM CHLORIDE, SODIUM LACTATE AND CALCIUM CHLORIDE 125 ML/HR: 600; 310; 30; 20 INJECTION, SOLUTION INTRAVENOUS at 18:52

## 2021-02-12 NOTE — H&P
[facility]  Obstetric History and Physical    Referring Provider: Carlos Lund MD      Chief Complaint   Patient presents with   • Contractions       Subjective     Patient is a 35 y.o. female  currently at 38w0d, who presents with regular contractions.    Her prenatal care is uneventful.      The following portions of the patients history were reviewed and updated as appropriate: current medications, allergies, past medical history, past surgical history, past family history, past social history and problem list .       Prenatal Information:   Maternal Prenatal Labs  Blood Type ABO Type   Date Value Ref Range Status   2021 A  Final      Rh Status RH type   Date Value Ref Range Status   2021 Negative  Final      Antibody Screen Antibody Screen   Date Value Ref Range Status   2021 Positive  Final      Gonnorhea No results found for: GCCX   Chlamydia No results found for: CLAMYDCU   RPR No results found for: RPR   Syphilis Antibody No results found for: SYPHILIS   Rubella No results found for: RUBELLAIGGIN   Hepatitis B Surface Antigen No results found for: HEPBSAG   HIV-1 Antibody No results found for: LABHIV1   Hepatitis C Antibody No results found for: HEPCAB   Rapid Urin Drug Screen No results found for: AMPMETHU, BARBITSCNUR, LABBENZSCN, LABMETHSCN, LABOPIASCN, THCURSCR, COCAINEUR, AMPHETSCREEN, PROPOXSCN, BUPRENORSCNU, METAMPSCNUR, OXYCODONESCN, TRICYCLICSCN   Group B Strep Culture No results found for: GBSANTIGEN           External Prenatal Results     Pregnancy Outside Results - Transcribed From Office Records - See Scanned Records For Details     Test Value Date Time    Hgb 11.5 g/dL 21 1342      11.2 g/dL 20     Hct 35.7 % 21 1342      32.9 % 20     ABO A  21 1634    Rh Negative  21 1634    Antibody Screen Positive  21 1429      Positive  20       See Final Results  20     Glucose Fasting GTT       Glucose Tolerance Test  1 hour       Glucose Tolerance Test 3 hour       Gonorrhea (discrete)       Chlamydia (discrete)       RPR       VDRL       Syphilis Antibody       Rubella       HBsAg       Herpes Simplex Virus PCR       Herpes Simplex VIrus Culture       HIV       Hep C RNA Quant PCR       Hep C Antibody       AFP       Group B Strep No Group B Streptococcus isolated  21    GBS Susceptibility to Clindamycin       GBS Susceptibility to Erythromycin       Fetal Fibronectin       Genetic Testing, Maternal Blood             Drug Screening     Test Value Date Time    Urine Drug Screen       Amphetamine Screen       Barbiturate Screen       Benzodiazepine Screen       Methadone Screen       Phencyclidine Screen       Opiates Screen       THC Screen       Cocaine Screen       Propoxyphene Screen       Buprenorphine Screen       Methamphetamine Screen       Oxycodone Screen       Tricyclic Antidepressants Screen                     Past OB History:       OB History    Para Term  AB Living   3 2 1 1 0 2   SAB TAB Ectopic Molar Multiple Live Births   0 0 0 0 0 2      # Outcome Date GA Lbr Sanya/2nd Weight Sex Delivery Anes PTL Lv   3 Current            2       Vag-Spont   ADRIAN   1 Term         ADRIAN       Past Medical History: History reviewed. No pertinent past medical history.   Past Surgical History Past Surgical History:   Procedure Laterality Date   • APPENDECTOMY        Family History: Family History   Problem Relation Age of Onset   • Heart attack Father    • Hypertension Other       Social History:  reports that she has never smoked. She has never used smokeless tobacco.   reports previous alcohol use.   reports no history of drug use.                   General ROS Negative Findings:Headaches, Visual Changes, Epigastric pain, Anorexia, Nausia/Vomiting, ROM and Vaginal Bleeding    Review of Systems   Constitution: Negative for chills and decreased appetite.   Eyes: Negative for visual disturbance.      Cardiovascular: Negative for chest pain and dyspnea on exertion.   Respiratory: Negative for cough and shortness of breath.    Skin: Negative for rash.   Musculoskeletal: Negative for back pain.   Gastrointestinal: Negative for bloating, abdominal pain, nausea and vomiting.   Genitourinary: Negative for dysuria, flank pain and frequency.   Neurological: Negative for headaches and light-headedness.   Psychiatric/Behavioral: Negative for depression.        All other systems have been reviewed and are neg  Objective       Vital Signs Range for the last 24 hours  Temperature: Temp:  [97.9 °F (36.6 °C)-98.5 °F (36.9 °C)] 97.9 °F (36.6 °C)   Temp Source: Temp src: Oral   BP: BP: (108-128)/(61-81) 108/61   Pulse: Heart Rate:  [] 86   Respirations: Resp:  [18] 18   SPO2:     O2 Amount (l/min):     O2 Devices     Weight: Weight:  [77.1 kg (170 lb)] 77.1 kg (170 lb)     Physical Examination:   General:   alert, appears stated age and cooperative   Skin:   normal   HEENT:     Lungs:   clear to auscultation bilaterally   Heart:   regular rate and rhythm, S1, S2 normal, no murmur, click, rub or gallop   Abdomen:  soft, non-tender; bowel sounds normal; no masses,  no organomegaly   Lower Extremities    Pelvis:  Vulva and vagina appear normal. Bimanual exam reveals normal uterus and adnexa.         Presentation: vertex   Cervix: Exam by: Method: sterile exam per physician   Dilation:  5   Effacement: Cervical Effacement: 80%   Station:  -1       Fetal Heart Rate Assessment   Method: Fetal HR Assessment Method: external   Beats/min: Fetal HR (beats/min): 130   Baseline: Fetal Heart Baseline Rate: normal range   Varibility: Fetal HR Variability: moderate (amplitude range 6 to 25 bpm)   Accels: Fetal HR Accelerations: greater than/equal to 15 bpm, lasting at least 15 seconds   Decels: Fetal HR Decelerations: absent   Tracing Category:       Uterine Assessment   Method: Method: external tocotransducer   Frequency (min):  Contraction Frequency (Minutes): 3-4   Ctx Count in 10 min:     Duration:     Intensity: Contraction Intensity: mild by palpation   Intensity by IUPC:     Resting Tone: Uterine Resting Tone: soft by palpation   Resting Tone by IUPC:     [place] Units:       Laboratory Results:   Lab Results (last 24 hours)     Procedure Component Value Units Date/Time    COVID PRE-OP / PRE-PROCEDURE SCREENING ORDER (NO ISOLATION) - Swab, Nasopharynx [[id number]]  (Normal) Collected: 02/12/21 1333    Specimen: Swab from Nasopharynx Updated: 02/12/21 1413    Narrative:      The following orders were created for panel order COVID PRE-OP / PRE-PROCEDURE SCREENING ORDER (NO ISOLATION) - Swab, Nasopharynx.  Procedure                               Abnormality         Status                     ---------                               -----------         ------                     COVID-19, ABBOTT IN-HOUS...[[id number]]  Normal              Final result                 Please view results for these tests on the individual orders.    COVID-19, ABBOTT IN-HOUSE,NASAL Swab (NO TRANSPORT MEDIA) 2 HR TAT - Swab, Nasopharynx [[id number]]  (Normal) Collected: 02/12/21 1333    Specimen: Swab from Nasopharynx Updated: 02/12/21 1413     COVID19 Presumptive Negative    Narrative:      Fact sheet for providers: https://www.fda.gov/media/513366/download     Fact sheet for patients: https://www.fda.gov/media/900913/download    Test performed by PCR.  If inconsistent with clinical signs and symptoms patient should be tested with different authorized molecular test.    CBC (No Diff) [[id number]]  (Abnormal) Collected: 02/12/21 1342    Specimen: Blood Updated: 02/12/21 1354     WBC 10.47 10*3/mm3      RBC 3.83 10*6/mm3      Hemoglobin 11.5 g/dL      Hematocrit 35.7 %      MCV 93.2 fL      MCH 30.0 pg      MCHC 32.2 g/dL      RDW 13.0 %      RDW-SD 44.6 fl      MPV 10.0 fL      Platelets 269 10*3/mm3         Radiology Review:   Imaging Results (Last 24 Hours)       ** No results found for the last 24 hours. **        Other Studies:    Assessment/Plan       Pregnancy        Assessment:  1.  Intrauterine pregnancy at 38w0d weeks gestation with reactive fetal status.    2.  Active labor  3. GBS negative    Plan:  1. Vaginal anticipated  2. Plan of care has been reviewed with patient.  3.  Risks, benefits of treatment plan have been discussed.  4.  All questions have been answered.  5. AROM performed with clear fluid.       Manny Carpio MD  2/12/2021  17:47 EST

## 2021-02-12 NOTE — ANESTHESIA PROCEDURE NOTES
Labor Epidural      Patient reassessed immediately prior to procedure    Patient location during procedure: OB  Performed By  Anesthesiologist: Manuela Constantino DO  Preanesthetic Checklist  Completed: patient identified, surgical consent, pre-op evaluation, timeout performed, IV checked, risks and benefits discussed and monitors and equipment checked  Additional Notes  DPE #[id number] mariana  Prep:  Pt Position:sitting  Sterile Tech:cap, gloves, mask and sterile barrier  Prep:DuraPrep  Monitoring:blood pressure monitoring  Epidural Block Procedure:  Approach:midline  Guidance:palpation technique  Location:L3-L4  Needle Type:Tuohy  Needle Gauge:17 G  Loss of Resistance Medium: air  Loss of Resistance: 4cm  Cath Depth at skin:12 cm  Paresthesia: none  Aspiration:negative  Test Dose:negative  Number of Attempts: 1  Post Assessment:  Dressing:occlusive dressing applied and secured with tape  Pt Tolerance:patient tolerated the procedure well with no apparent complications  Complications:no

## 2021-02-13 LAB
ABO GROUP BLD: NORMAL
FETAL BLEED: NEGATIVE
NUMBER OF DOSES: NORMAL
RH BLD: NEGATIVE

## 2021-02-13 PROCEDURE — 86901 BLOOD TYPING SEROLOGIC RH(D): CPT | Performed by: OBSTETRICS & GYNECOLOGY

## 2021-02-13 PROCEDURE — 85461 HEMOGLOBIN FETAL: CPT | Performed by: OBSTETRICS & GYNECOLOGY

## 2021-02-13 PROCEDURE — 86900 BLOOD TYPING SEROLOGIC ABO: CPT | Performed by: OBSTETRICS & GYNECOLOGY

## 2021-02-13 PROCEDURE — 25010000002 RHO D IMMUNE GLOBULIN 1500 UNIT/2ML SOLUTION PREFILLED SYRINGE: Performed by: NURSE PRACTITIONER

## 2021-02-13 RX ORDER — PROMETHAZINE HYDROCHLORIDE 25 MG/1
25 TABLET ORAL ONCE AS NEEDED
Status: DISCONTINUED | OUTPATIENT
Start: 2021-02-13 | End: 2021-02-14 | Stop reason: HOSPADM

## 2021-02-13 RX ORDER — DOCUSATE SODIUM 100 MG/1
100 CAPSULE, LIQUID FILLED ORAL 2 TIMES DAILY
Status: DISCONTINUED | OUTPATIENT
Start: 2021-02-13 | End: 2021-02-14 | Stop reason: HOSPADM

## 2021-02-13 RX ORDER — OXYCODONE HYDROCHLORIDE 5 MG/1
5 TABLET ORAL EVERY 4 HOURS PRN
Status: DISCONTINUED | OUTPATIENT
Start: 2021-02-13 | End: 2021-02-14 | Stop reason: HOSPADM

## 2021-02-13 RX ORDER — HYDROCORTISONE 25 MG/G
1 CREAM TOPICAL AS NEEDED
Status: DISCONTINUED | OUTPATIENT
Start: 2021-02-13 | End: 2021-02-14 | Stop reason: HOSPADM

## 2021-02-13 RX ORDER — IBUPROFEN 600 MG/1
600 TABLET ORAL EVERY 6 HOURS
Status: DISCONTINUED | OUTPATIENT
Start: 2021-02-13 | End: 2021-02-14 | Stop reason: HOSPADM

## 2021-02-13 RX ORDER — PRENATAL VIT/IRON FUM/FOLIC AC 27MG-0.8MG
1 TABLET ORAL DAILY
Status: DISCONTINUED | OUTPATIENT
Start: 2021-02-13 | End: 2021-02-14 | Stop reason: HOSPADM

## 2021-02-13 RX ORDER — METOCLOPRAMIDE 10 MG/1
10 TABLET ORAL ONCE AS NEEDED
Status: DISCONTINUED | OUTPATIENT
Start: 2021-02-13 | End: 2021-02-14 | Stop reason: HOSPADM

## 2021-02-13 RX ORDER — SODIUM CHLORIDE 0.9 % (FLUSH) 0.9 %
1-10 SYRINGE (ML) INJECTION AS NEEDED
Status: DISCONTINUED | OUTPATIENT
Start: 2021-02-13 | End: 2021-02-14 | Stop reason: HOSPADM

## 2021-02-13 RX ORDER — PROMETHAZINE HYDROCHLORIDE 12.5 MG/1
12.5 SUPPOSITORY RECTAL ONCE AS NEEDED
Status: DISCONTINUED | OUTPATIENT
Start: 2021-02-13 | End: 2021-02-14 | Stop reason: HOSPADM

## 2021-02-13 RX ORDER — BISACODYL 10 MG
10 SUPPOSITORY, RECTAL RECTAL DAILY PRN
Status: DISCONTINUED | OUTPATIENT
Start: 2021-02-14 | End: 2021-02-14 | Stop reason: HOSPADM

## 2021-02-13 RX ORDER — LANOLIN
CREAM (ML) TOPICAL
Status: DISCONTINUED | OUTPATIENT
Start: 2021-02-13 | End: 2021-02-14 | Stop reason: HOSPADM

## 2021-02-13 RX ORDER — ACETAMINOPHEN 325 MG/1
650 TABLET ORAL
Status: DISCONTINUED | OUTPATIENT
Start: 2021-02-13 | End: 2021-02-14 | Stop reason: HOSPADM

## 2021-02-13 RX ADMIN — ACETAMINOPHEN 650 MG: 325 TABLET ORAL at 18:32

## 2021-02-13 RX ADMIN — HUMAN RHO(D) IMMUNE GLOBULIN 1500 UNITS: 1500 SOLUTION INTRAMUSCULAR; INTRAVENOUS at 23:37

## 2021-02-13 RX ADMIN — ACETAMINOPHEN 650 MG: 325 TABLET ORAL at 23:30

## 2021-02-13 RX ADMIN — DOCUSATE SODIUM 100 MG: 100 CAPSULE, LIQUID FILLED ORAL at 08:18

## 2021-02-13 RX ADMIN — ACETAMINOPHEN 650 MG: 325 TABLET ORAL at 12:28

## 2021-02-13 RX ADMIN — Medication: at 02:48

## 2021-02-13 RX ADMIN — IBUPROFEN 600 MG: 600 TABLET, FILM COATED ORAL at 18:31

## 2021-02-13 RX ADMIN — IBUPROFEN 600 MG: 600 TABLET, FILM COATED ORAL at 06:19

## 2021-02-13 RX ADMIN — DOCUSATE SODIUM 100 MG: 100 CAPSULE, LIQUID FILLED ORAL at 23:30

## 2021-02-13 RX ADMIN — OXYTOCIN 85 ML/HR: 10 INJECTION INTRAVENOUS at 00:30

## 2021-02-13 RX ADMIN — WITCH HAZEL 1 PAD: 500 SOLUTION RECTAL; TOPICAL at 02:49

## 2021-02-13 RX ADMIN — IBUPROFEN 600 MG: 600 TABLET, FILM COATED ORAL at 12:28

## 2021-02-13 RX ADMIN — ACETAMINOPHEN 650 MG: 325 TABLET ORAL at 06:20

## 2021-02-13 RX ADMIN — PRENATAL VITAMINS-IRON FUMARATE 27 MG IRON-FOLIC ACID 0.8 MG TABLET 1 TABLET: at 08:18

## 2021-02-13 RX ADMIN — ACETAMINOPHEN 650 MG: 325 TABLET ORAL at 02:49

## 2021-02-13 RX ADMIN — HYDROCORTISONE 2.5% 1 APPLICATION: 25 CREAM TOPICAL at 02:49

## 2021-02-13 NOTE — L&D DELIVERY NOTE
[facility]  Vaginal Delivery Note    Delivery     Delivery: Vaginal, Spontaneous     YOB: 2019    Time of Birth:  Gestational Age 11:21 PM   38w0d     Anesthesia:   Epidural   Delivering clinician: Manny Carpio    Forceps?   No   Vacuum? No    Shoulder dystocia present: No        Delivery narrative:  The patient delivered a viable male infant by  without complications. Suction performed at delivery and the infant placed on maternal chest. Cord milking and delayed cord clamping performed. Pitocin given IV and the placenta delivered with gentle downward traction. A second degree laceration repaired with 2.0 vicryl. Good hemostasis noted.     Infant    Findings: male  infant     Infant observations: Weight: 3540 g (7 lb 12.9 oz)   Length: 19.25  in  Observations/Comments:        Apgars: 8  @ 1 minute /    9  @ 5 minutes   Infant Name: John     Placenta, Cord, and Fluid    Placenta delivered  Spontaneous  at   2021 11:24 PM     Cord: 3 vessels  present.   Nuchal Cord?  no   Cord blood obtained: Yes    Cord gases obtained:  No    Cord gas results: Venous:  No results found for: PHCVEN    Arterial:  No results found for: PHCART     Repair    Episiotomy: Not recorded     No    Lacerations: Yes  Laceration Information  Laceration Repaired?   Perineal:  2nd degree  yes   Periurethral:       Labial:       Sulcus:       Vaginal:       Cervical:         Suture used for repair: 2-0 Vicryl   Estimated Blood Loss:  200ml           Complications  none    Disposition  Mother to Mother Baby/Postpartum  in stable condition currently.  Baby to NBN  in stable condition currently.      Manny Carpio MD  21  23:42 EST

## 2021-02-13 NOTE — PROGRESS NOTES
Postpartum Progress Note    Patient name: Meaghan Erazo  YOB: 1985   MRN: [id number]  Referring Provider: Carlos Lund MD  Admission Date: 2021  Date of Service: 2021    ID: 35 y.o.     Diagnosis:   S/p vaginal delivery   Patient Active Problem List   Diagnosis   • Maternal care for breech presentation, single gestation   • 34 weeks gestation of pregnancy   • History of premature delivery   • Pregnancy       Subjective:      No complaints.  Moderate lochia.  Ambulating, voiding, tolerating diet.  Pain well controlled.  The patient is currently breastfeeding.   This baby is a boy and they desire circumcision.    Objective:      Vital signs:  Vital Signs Range for the last 24 hours  Temperature: Temp:  [97.7 °F (36.5 °C)-99 °F (37.2 °C)] 98.2 °F (36.8 °C)   Temp Source: Temp src: Oral   BP: BP: ()/(50-81) 115/56   Pulse: Heart Rate:  [] 92   Respirations: Resp:  [16-18] 16   Weight: 77.1 kg (170 lb)     General: Alert & oriented x4, in no apparent distress  Abdomen: soft, nontender  Uterus: firm, nontender  Extremities: nontender; no edema      Labs:  Lab Results   Component Value Date    WBC 10.47 2021    HGB 11.5 (L) 2021    HCT 35.7 2021    MCV 93.2 2021     2021     Results from last 7 days   Lab Units 21  1634   ABO TYPING  A   RH TYPING  Negative     External Prenatal Results     Pregnancy Outside Results - Transcribed From Office Records - See Scanned Records For Details     Test Value Date Time    Hgb 11.5 g/dL 21 1342      11.2 g/dL 20     Hct 35.7 % 21 1342      32.9 % 20     ABO A  21 1634    Rh Negative  21 1634    Antibody Screen Positive  21 1429      Positive  20       See Final Results  20     Glucose Fasting GTT       Glucose Tolerance Test 1 hour       Glucose Tolerance Test 3 hour       Gonorrhea (discrete)       Chlamydia (discrete)       RPR        VDRL       Syphilis Antibody       Rubella       HBsAg       Herpes Simplex Virus PCR       Herpes Simplex VIrus Culture       HIV       Hep C RNA Quant PCR       Hep C Antibody       AFP       Group B Strep No Group B Streptococcus isolated  02/02/21 2148    GBS Susceptibility to Clindamycin       GBS Susceptibility to Erythromycin       Fetal Fibronectin       Genetic Testing, Maternal Blood             Drug Screening     Test Value Date Time    Urine Drug Screen       Amphetamine Screen       Barbiturate Screen       Benzodiazepine Screen       Methadone Screen       Phencyclidine Screen       Opiates Screen       THC Screen       Cocaine Screen       Propoxyphene Screen       Buprenorphine Screen       Methamphetamine Screen       Oxycodone Screen       Tricyclic Antidepressants Screen                   Assessment/Plan:      PPD#1 s/p vaginal delivery.  1. S/p vaginal delivery: Doing well.Continue routine postpartum care.    2. Infant feeding: Supportive care.  The patient is currently breastfeeding.  3. Contraception: Education given regarding options for contraception, including barrier methods, injectable contraception, IUD placement, oral contraceptives.    Manny Carpio MD  02/13/21

## 2021-02-14 VITALS
HEIGHT: 65 IN | BODY MASS INDEX: 28.32 KG/M2 | RESPIRATION RATE: 18 BRPM | WEIGHT: 170 LBS | DIASTOLIC BLOOD PRESSURE: 61 MMHG | TEMPERATURE: 98.1 F | HEART RATE: 77 BPM | SYSTOLIC BLOOD PRESSURE: 109 MMHG

## 2021-02-14 LAB
BASOPHILS # BLD AUTO: 0.04 10*3/MM3 (ref 0–0.2)
BASOPHILS NFR BLD AUTO: 0.5 % (ref 0–1.5)
DEPRECATED RDW RBC AUTO: 46.5 FL (ref 37–54)
EOSINOPHIL # BLD AUTO: 0.11 10*3/MM3 (ref 0–0.4)
EOSINOPHIL NFR BLD AUTO: 1.3 % (ref 0.3–6.2)
ERYTHROCYTE [DISTWIDTH] IN BLOOD BY AUTOMATED COUNT: 13.3 % (ref 12.3–15.4)
HCT VFR BLD AUTO: 33.9 % (ref 34–46.6)
HGB BLD-MCNC: 10.8 G/DL (ref 12–15.9)
IMM GRANULOCYTES # BLD AUTO: 0.07 10*3/MM3 (ref 0–0.05)
IMM GRANULOCYTES NFR BLD AUTO: 0.8 % (ref 0–0.5)
LYMPHOCYTES # BLD AUTO: 1.05 10*3/MM3 (ref 0.7–3.1)
LYMPHOCYTES NFR BLD AUTO: 12.1 % (ref 19.6–45.3)
MCH RBC QN AUTO: 30.6 PG (ref 26.6–33)
MCHC RBC AUTO-ENTMCNC: 31.9 G/DL (ref 31.5–35.7)
MCV RBC AUTO: 96 FL (ref 79–97)
MONOCYTES # BLD AUTO: 0.44 10*3/MM3 (ref 0.1–0.9)
MONOCYTES NFR BLD AUTO: 5.1 % (ref 5–12)
NEUTROPHILS NFR BLD AUTO: 6.96 10*3/MM3 (ref 1.7–7)
NEUTROPHILS NFR BLD AUTO: 80.2 % (ref 42.7–76)
NRBC BLD AUTO-RTO: 0 /100 WBC (ref 0–0.2)
PLATELET # BLD AUTO: 247 10*3/MM3 (ref 140–450)
PMV BLD AUTO: 10.3 FL (ref 6–12)
RBC # BLD AUTO: 3.53 10*6/MM3 (ref 3.77–5.28)
WBC # BLD AUTO: 8.67 10*3/MM3 (ref 3.4–10.8)

## 2021-02-14 PROCEDURE — 85025 COMPLETE CBC W/AUTO DIFF WBC: CPT | Performed by: OBSTETRICS & GYNECOLOGY

## 2021-02-14 RX ORDER — IBUPROFEN 600 MG/1
600 TABLET ORAL EVERY 6 HOURS PRN
Qty: 30 TABLET | Refills: 3 | Status: SHIPPED | OUTPATIENT
Start: 2021-02-14

## 2021-02-14 RX ADMIN — ACETAMINOPHEN 650 MG: 325 TABLET ORAL at 03:19

## 2021-02-14 RX ADMIN — DOCUSATE SODIUM 100 MG: 100 CAPSULE, LIQUID FILLED ORAL at 10:22

## 2021-02-14 RX ADMIN — PRENATAL VITAMINS-IRON FUMARATE 27 MG IRON-FOLIC ACID 0.8 MG TABLET 1 TABLET: at 10:21

## 2021-02-14 RX ADMIN — Medication: at 13:04

## 2021-02-14 RX ADMIN — IBUPROFEN 600 MG: 600 TABLET, FILM COATED ORAL at 12:45

## 2021-02-14 RX ADMIN — WITCH HAZEL 1 PAD: 500 SOLUTION RECTAL; TOPICAL at 13:05

## 2021-02-14 RX ADMIN — IBUPROFEN 600 MG: 600 TABLET, FILM COATED ORAL at 01:00

## 2021-02-14 RX ADMIN — ACETAMINOPHEN 650 MG: 325 TABLET ORAL at 06:29

## 2021-02-14 RX ADMIN — ACETAMINOPHEN 650 MG: 325 TABLET ORAL at 12:45

## 2021-02-14 RX ADMIN — IBUPROFEN 600 MG: 600 TABLET, FILM COATED ORAL at 06:29

## 2021-02-14 NOTE — DISCHARGE SUMMARY
Discharge Summary    Date of Admission: 2021  Date of Discharge:  2021      Patient: Meaghan Erazo      MR#:[id number]    Delivery Provider: Manny Carpio     Discharge Surgeon/OB: Dr. Manny Carpio    Presenting Problem/History of Present Illness  Pregnancy [Z34.90]     Patient Active Problem List    Diagnosis   • Pregnancy [Z34.90]   • History of premature delivery [Z87.51]   • 34 weeks gestation of pregnancy [Z3A.34]   • Maternal care for breech presentation, single gestation [O32.1XX0]         Discharge Diagnosis: Vaginal delivery at 38w0d    Procedures:  Vaginal, Spontaneous     2021    11:21 PM        Discharge Date: 2021;     Hospital Course  Patient is a 35 y.o. female  at 38w0d status post vaginal delivery without complication. Postpartum the patient did well. She remained afebrile, with vital signs stable. She was ready for discharge on postpartum day 2.     Infant:   male  fetus 3540 g (7 lb 12.9 oz)  with Apgar scores of 8 , 9  at five minutes.    Condition on Discharge:  Stable    Vital Signs  Temp:  [97.9 °F (36.6 °C)-98.7 °F (37.1 °C)] 98.1 °F (36.7 °C)  Heart Rate:  [70-78] 77  Resp:  [16-18] 18  BP: (106-114)/(60-67) 109/61    Lab Results   Component Value Date    WBC 8.67 2021    HGB 10.8 (L) 2021    HCT 33.9 (L) 2021    MCV 96.0 2021     2021       Discharge Disposition  Home or Self Care    Discharge Medications     Discharge Medications      New Medications      Instructions Start Date   ibuprofen 600 MG tablet  Commonly known as: ADVIL,MOTRIN   600 mg, Oral, Every 6 Hours PRN         Continue These Medications      Instructions Start Date   prenatal vitamin 27-0.8 27-0.8 MG tablet tablet   Oral, Daily             Discharge Diet:   regular  Activity at Discharge:   Ad dallin  Follow-up Appointments  Future Appointments   Date Time Provider Department Center   2021  3:50 PM Carlos Lund MD [facility]          Manny Carpio MD  02/14/21  12:29 EST  [facility]

## 2021-02-14 NOTE — LACTATION NOTE
Mothers 3rd baby. Nursed 1st 2; 6wk and 5mths. Infant l/o and nursing during visit. Noted infant dimpling. Assisted with positioning and latch to improve areola grasp with success. Infant l/o and NW. Instructed in importance of skin to skin. Encouraged and instructed importance of waking infant and attempting to nurse every 2-3hrs. Instructed waking techniques. Instructed presence of and importance of colostrum.  Instructed in ss adq latch and suck including ss complications to report. Instructed in ss adq infant intake. To call if need or concern. VU

## 2021-02-14 NOTE — LACTATION NOTE
02/14/21 1250   Maternal Information   Person Making Referral nurse;patient   Maternal Reason for Referral   (states baby is breastfeeding well; wants pump demo)   Milk Expression/Equipment   Breast Pump Type double electric, personal  (has spectra pump--demonstrated use)   Breast Pump Flange Type hard   Breast Pump Flange Size 24 mm   Breast Pumping   Breast Pumping Interventions   (can pump if baby missed feedings)   Discussed milk supply, pump, milk storage, fu with pediatrician. To call lactation services, if there are questions or concerns or if mom wants help with a breastfeeding.

## 2021-02-15 NOTE — PAYOR COMM NOTE
"Meaghan Erazo RUBEN (35 y.o. Female)     Ref#[id number]    Delivery information.    From: Maureen Ross  #[phone]  Fax#[phone]      Date of Birth Social Security Number Address Home Phone MRN    1985  [id number], [place]. \[facility]"" [id number] [phone] [id number]    Jainism Marital Status          Latter-day        Admission Date Admission Type Admitting Provider Attending Provider Department, Room/Bed    2/12/21 Elective Manny Carpio MD  [facility] MOTHER BABY 4B, N425/1    Discharge Date Discharge Disposition Discharge Destination        2/14/2021 Home or Self Care              Attending Provider: (none)   Allergies: No Known Allergies    Isolation: None   Infection: None   Code Status: Prior    Ht: 165.1 cm (65\")   Wt: 77.1 kg (170 lb)    Admission Cmt: None   Principal Problem: None                Active Insurance as of 2/12/2021     Primary Coverage     Payor Plan Insurance Group Employer/Plan Group    ANTHEM BLUE CROSS ANTHEM BLUE CROSS BLUE SHIELD PPO [id number]     Payor Plan Address Payor Plan Phone Number Payor Plan Fax Number Effective Dates    [address] [phone]  8/1/2011 - None Entered    [place] [address]       Subscriber Name Subscriber Birth Date Member ID       ARCADIO MICHELE 1985 [id number]                 Emergency Contacts      (Rel.) Home Phone Work Phone Mobile Phone    IRMA ERAZO (Spouse) [phone] -- --            Insurance Information                ANTHEM BLUE CROSS/ANTHEM BLUE CROSS BLUE SHIELD PPO Phone: [phone]    Subscriber: ARCADIO Michele Subscriber#: [id number]    Group#: [id number] Precert#:           Problem List           Codes Noted - Resolved       Hospital    Pregnancy ICD-10-CM: Z34.90  ICD-9-CM: V22.2 2/12/2021 - Present       Non-Hospital    History of premature delivery ICD-10-CM: Z87.51  ICD-9-CM: V13.21 2/2/2021 - Present    34 weeks gestation of pregnancy ICD-10-CM: " Z3A.34  ICD-9-CM: V22.2 2021 - Present    Maternal care for breech presentation, single gestation ICD-10-CM: O32.1XX0  ICD-9-CM: 652.23 2021 - Present             History & Physical      Manny Carpio MD at 21 1747          [facility]  Obstetric History and Physical    Referring Provider: Carlos Lund MD      Chief Complaint   Patient presents with   • Contractions       Subjective     Patient is a 35 y.o. female  currently at 38w0d, who presents with regular contractions.    Her prenatal care is uneventful.      The following portions of the patients history were reviewed and updated as appropriate: current medications, allergies, past medical history, past surgical history, past family history, past social history and problem list .       Prenatal Information:   Maternal Prenatal Labs  Blood Type ABO Type   Date Value Ref Range Status   2021 A  Final      Rh Status RH type   Date Value Ref Range Status   2021 Negative  Final      Antibody Screen Antibody Screen   Date Value Ref Range Status   2021 Positive  Final      Gonnorhea No results found for: GCCX   Chlamydia No results found for: CLAMYDCU   RPR No results found for: RPR   Syphilis Antibody No results found for: SYPHILIS   Rubella No results found for: RUBELLAIGGIN   Hepatitis B Surface Antigen No results found for: HEPBSAG   HIV-1 Antibody No results found for: LABHIV1   Hepatitis C Antibody No results found for: HEPCAB   Rapid Urin Drug Screen No results found for: AMPMETHU, BARBITSCNUR, LABBENZSCN, LABMETHSCN, LABOPIASCN, THCURSCR, COCAINEUR, AMPHETSCREEN, PROPOXSCN, BUPRENORSCNU, METAMPSCNUR, OXYCODONESCN, TRICYCLICSCN   Group B Strep Culture No results found for: GBSANTIGEN           External Prenatal Results     Pregnancy Outside Results - Transcribed From Office Records - See Scanned Records For Details     Test Value Date Time    Hgb 11.5 g/dL 21 1342      11.2 g/dL 20     Hct  35.7 % 21 1342      32.9 % 20     ABO A  21 1634    Rh Negative  21 1634    Antibody Screen Positive  21 1429      Positive  20       See Final Results  20     Glucose Fasting GTT       Glucose Tolerance Test 1 hour       Glucose Tolerance Test 3 hour       Gonorrhea (discrete)       Chlamydia (discrete)       RPR       VDRL       Syphilis Antibody       Rubella       HBsAg       Herpes Simplex Virus PCR       Herpes Simplex VIrus Culture       HIV       Hep C RNA Quant PCR       Hep C Antibody       AFP       Group B Strep No Group B Streptococcus isolated  21 2144    GBS Susceptibility to Clindamycin       GBS Susceptibility to Erythromycin       Fetal Fibronectin       Genetic Testing, Maternal Blood             Drug Screening     Test Value Date Time    Urine Drug Screen       Amphetamine Screen       Barbiturate Screen       Benzodiazepine Screen       Methadone Screen       Phencyclidine Screen       Opiates Screen       THC Screen       Cocaine Screen       Propoxyphene Screen       Buprenorphine Screen       Methamphetamine Screen       Oxycodone Screen       Tricyclic Antidepressants Screen                     Past OB History:       OB History    Para Term  AB Living   3 2 1 1 0 2   SAB TAB Ectopic Molar Multiple Live Births   0 0 0 0 0 2      # Outcome Date GA Lbr Sanya/2nd Weight Sex Delivery Anes PTL Lv   3 Current            2       Vag-Spont   ADRIAN   1 Term         ADRIAN       Past Medical History: History reviewed. No pertinent past medical history.   Past Surgical History Past Surgical History:   Procedure Laterality Date   • APPENDECTOMY        Family History: Family History   Problem Relation Age of Onset   • Heart attack Father    • Hypertension Other       Social History:  reports that she has never smoked. She has never used smokeless tobacco.   reports previous alcohol use.   reports no history of drug use.                    General ROS Negative Findings:Headaches, Visual Changes, Epigastric pain, Anorexia, Nausia/Vomiting, ROM and Vaginal Bleeding    Review of Systems   Constitution: Negative for chills and decreased appetite.   Eyes: Negative for visual disturbance.   Cardiovascular: Negative for chest pain and dyspnea on exertion.   Respiratory: Negative for cough and shortness of breath.    Skin: Negative for rash.   Musculoskeletal: Negative for back pain.   Gastrointestinal: Negative for bloating, abdominal pain, nausea and vomiting.   Genitourinary: Negative for dysuria, flank pain and frequency.   Neurological: Negative for headaches and light-headedness.   Psychiatric/Behavioral: Negative for depression.        All other systems have been reviewed and are neg  Objective       Vital Signs Range for the last 24 hours  Temperature: Temp:  [97.9 °F (36.6 °C)-98.5 °F (36.9 °C)] 97.9 °F (36.6 °C)   Temp Source: Temp src: Oral   BP: BP: (108-128)/(61-81) 108/61   Pulse: Heart Rate:  [] 86   Respirations: Resp:  [18] 18   SPO2:     O2 Amount (l/min):     O2 Devices     Weight: Weight:  [77.1 kg (170 lb)] 77.1 kg (170 lb)     Physical Examination:   General:   alert, appears stated age and cooperative   Skin:   normal   HEENT:     Lungs:   clear to auscultation bilaterally   Heart:   regular rate and rhythm, S1, S2 normal, no murmur, click, rub or gallop   Abdomen:  soft, non-tender; bowel sounds normal; no masses,  no organomegaly   Lower Extremities    Pelvis:  Vulva and vagina appear normal. Bimanual exam reveals normal uterus and adnexa.         Presentation: vertex   Cervix: Exam by: Method: sterile exam per physician   Dilation:  5   Effacement: Cervical Effacement: 80%   Station:  -1       Fetal Heart Rate Assessment   Method: Fetal HR Assessment Method: external   Beats/min: Fetal HR (beats/min): 130   Baseline: Fetal Heart Baseline Rate: normal range   Varibility: Fetal HR Variability: moderate (amplitude range 6 to 25  bpm)   Accels: Fetal HR Accelerations: greater than/equal to 15 bpm, lasting at least 15 seconds   Decels: Fetal HR Decelerations: absent   Tracing Category:       Uterine Assessment   Method: Method: external tocotransducer   Frequency (min): Contraction Frequency (Minutes): 3-4   Ctx Count in 10 min:     Duration:     Intensity: Contraction Intensity: mild by palpation   Intensity by IUPC:     Resting Tone: Uterine Resting Tone: soft by palpation   Resting Tone by IUPC:     [place] Units:       Laboratory Results:   Lab Results (last 24 hours)     Procedure Component Value Units Date/Time    COVID PRE-OP / PRE-PROCEDURE SCREENING ORDER (NO ISOLATION) - Swab, Nasopharynx [[id number]]  (Normal) Collected: 02/12/21 1333    Specimen: Swab from Nasopharynx Updated: 02/12/21 1413    Narrative:      The following orders were created for panel order COVID PRE-OP / PRE-PROCEDURE SCREENING ORDER (NO ISOLATION) - Swab, Nasopharynx.  Procedure                               Abnormality         Status                     ---------                               -----------         ------                     COVID-19, ABBOTT IN-HOUS...[[id number]]  Normal              Final result                 Please view results for these tests on the individual orders.    COVID-19, ABBOTT IN-HOUSE,NASAL Swab (NO TRANSPORT MEDIA) 2 HR TAT - Swab, Nasopharynx [[id number]]  (Normal) Collected: 02/12/21 1333    Specimen: Swab from Nasopharynx Updated: 02/12/21 1413     COVID19 Presumptive Negative    Narrative:      Fact sheet for providers: https://www.fda.gov/media/302254/download     Fact sheet for patients: https://www.fda.gov/media/180276/download    Test performed by PCR.  If inconsistent with clinical signs and symptoms patient should be tested with different authorized molecular test.    CBC (No Diff) [[id number]]  (Abnormal) Collected: 02/12/21 1342    Specimen: Blood Updated: 02/12/21 1354     WBC 10.47 10*3/mm3      RBC 3.83 10*6/mm3       Hemoglobin 11.5 g/dL      Hematocrit 35.7 %      MCV 93.2 fL      MCH 30.0 pg      MCHC 32.2 g/dL      RDW 13.0 %      RDW-SD 44.6 fl      MPV 10.0 fL      Platelets 269 10*3/mm3         Radiology Review:   Imaging Results (Last 24 Hours)     ** No results found for the last 24 hours. **        Other Studies:    Assessment/Plan       Pregnancy        Assessment:  1.  Intrauterine pregnancy at 38w0d weeks gestation with reactive fetal status.    2.  Active labor  3. GBS negative    Plan:  1. Vaginal anticipated  2. Plan of care has been reviewed with patient.  3.  Risks, benefits of treatment plan have been discussed.  4.  All questions have been answered.  5. AROM performed with clear fluid.       Manny Carpio MD  2/12/2021  17:47 EST    Electronically signed by Manny Carpio MD at 02/12/21 1751         Facility-Administered Medications as of 2/14/2021   Medication Dose Route Frequency Provider Last Rate Last Admin   • [COMPLETED] lactated ringers bolus 1,000 mL  1,000 mL Intravenous PRN Manuela Constantino DO 4,000 mL/hr at 02/12/21 1809 1,000 mL at 02/12/21 1809   • [COMPLETED] oxytocin in sodium chloride (PITOCIN) 30 UNIT/500ML infusion solution  85 mL/hr Intravenous Once Manny Carpio MD 85 mL/hr at 02/13/21 0030 85 mL/hr at 02/13/21 0030     Lab Results (last 72 hours)     Procedure Component Value Units Date/Time    CBC & Differential [[id number]]  (Abnormal) Collected: 02/14/21 0803    Specimen: Blood Updated: 02/14/21 0838    Narrative:      The following orders were created for panel order CBC & Differential.  Procedure                               Abnormality         Status                     ---------                               -----------         ------                     CBC Auto Differential[[id number]]        Abnormal            Final result                 Please view results for these tests on the individual orders.    CBC Auto Differential [[id number]]   (Abnormal) Collected: 02/14/21 0803    Specimen: Blood Updated: 02/14/21 0838     WBC 8.67 10*3/mm3      RBC 3.53 10*6/mm3      Hemoglobin 10.8 g/dL      Hematocrit 33.9 %      MCV 96.0 fL      MCH 30.6 pg      MCHC 31.9 g/dL      RDW 13.3 %      RDW-SD 46.5 fl      MPV 10.3 fL      Platelets 247 10*3/mm3      Neutrophil % 80.2 %      Lymphocyte % 12.1 %      Monocyte % 5.1 %      Eosinophil % 1.3 %      Basophil % 0.5 %      Immature Grans % 0.8 %      Neutrophils, Absolute 6.96 10*3/mm3      Lymphocytes, Absolute 1.05 10*3/mm3      Monocytes, Absolute 0.44 10*3/mm3      Eosinophils, Absolute 0.11 10*3/mm3      Basophils, Absolute 0.04 10*3/mm3      Immature Grans, Absolute 0.07 10*3/mm3      nRBC 0.0 /100 WBC     COVID PRE-OP / PRE-PROCEDURE SCREENING ORDER (NO ISOLATION) - Swab, Nasopharynx [[id number]]  (Normal) Collected: 02/12/21 1333    Specimen: Swab from Nasopharynx Updated: 02/12/21 1413    Narrative:      The following orders were created for panel order COVID PRE-OP / PRE-PROCEDURE SCREENING ORDER (NO ISOLATION) - Swab, Nasopharynx.  Procedure                               Abnormality         Status                     ---------                               -----------         ------                     COVID-19, ABBOTT IN-HOUS...[[id number]]  Normal              Final result                 Please view results for these tests on the individual orders.    COVID-19, ABBOTT IN-HOUSE,NASAL Swab (NO TRANSPORT MEDIA) 2 HR TAT - Swab, Nasopharynx [[id number]]  (Normal) Collected: 02/12/21 1333    Specimen: Swab from Nasopharynx Updated: 02/12/21 1413     COVID19 Presumptive Negative    Narrative:      Fact sheet for providers: https://www.fda.gov/media/622804/download     Fact sheet for patients: https://www.fda.gov/media/826846/download    Test performed by PCR.  If inconsistent with clinical signs and symptoms patient should be tested with different authorized molecular test.    CBC (No Diff) [[id number]]   (Abnormal) Collected: 02/12/21 1342    Specimen: Blood Updated: 02/12/21 1354     WBC 10.47 10*3/mm3      RBC 3.83 10*6/mm3      Hemoglobin 11.5 g/dL      Hematocrit 35.7 %      MCV 93.2 fL      MCH 30.0 pg      MCHC 32.2 g/dL      RDW 13.0 %      RDW-SD 44.6 fl      MPV 10.0 fL      Platelets 269 10*3/mm3           Orders (last 72 hrs)      Start     Ordered    02/14/21 1228  Discharge patient  Once      02/14/21 1228    02/14/21 0800  Sitz Bath  3 Times Daily,   Status:  Canceled     Comments: PRN    02/13/21 0157    02/14/21 0600  CBC & Differential  Timed     Comments: Postpartum Day 1      02/13/21 0157    02/14/21 0600  Hemoglobin & Hematocrit, Blood  Timed,   Status:  Canceled     Comments: Postpartum Day 1      02/13/21 0157    02/14/21 0600  bisacodyl (DULCOLAX) suppository 10 mg  Daily PRN,   Status:  Discontinued      02/13/21 0157 02/14/21 0600  CBC Auto Differential  PROCEDURE ONCE     Comments: Postpartum Day 1      02/14/21 0002    02/14/21 0000  ibuprofen (ADVIL,MOTRIN) 600 MG tablet  Every 6 Hours PRN      02/14/21 1228    02/13/21 1623  Doses of Rh Immune Globulin  Once      02/13/21 1622    02/13/21 0915  Rho D Immune Globulin (RHOPHYLAC) injection 1,500 Units  As Needed,   Status:  Discontinued      02/13/21 0915    02/13/21 0900  docusate sodium (COLACE) capsule 100 mg  2 Times Daily,   Status:  Discontinued      02/13/21 0157 02/13/21 0900  prenatal vitamin tablet 1 tablet  Daily,   Status:  Discontinued      02/13/21 0157 02/13/21 0500  ibuprofen (ADVIL,MOTRIN) tablet 600 mg  Every 6 Hours,   Status:  Discontinued      02/13/21 0157 02/13/21 0339  Postpartum RhIg Evaluation  Once      02/13/21 0338    02/13/21 0339  Fetal Bleed Screen  Once      02/13/21 0338    02/13/21 0215  acetaminophen (TYLENOL) tablet 650 mg  Every 4 Hours Scheduled,   Status:  Discontinued      02/13/21 0157 02/13/21 0158  Code Status and Medical Interventions:  Continuous,   Status:  Canceled       02/13/21 0157 02/13/21 0158  Vital Signs Per hospital policy  Per Hospital Policy,   Status:  Canceled      02/13/21 0157 02/13/21 0158  Notify Physician  Until Discontinued,   Status:  Canceled      02/13/21 0157 02/13/21 0158  Up Ad Edith  Until Discontinued,   Status:  Canceled      02/13/21 0157 02/13/21 0158  Patient May Shower  Once,   Status:  Canceled      02/13/21 0157 02/13/21 0158  Diet Regular  Diet Effective Now,   Status:  Canceled      02/13/21 0157 02/13/21 0158  Advance Diet as Tolerated  Until Discontinued,   Status:  Canceled      02/13/21 0157 02/13/21 0158  Fundal and Lochia Check  Per Hospital Policy,   Status:  Canceled     Comments: Q 15 min x 4, Q 30 min x 2, then Q Shift    02/13/21 0157 02/13/21 0158  RN to Assess Rh Status & Place RhIG Evaluation Order if Indicated  Continuous,   Status:  Canceled      02/13/21 0157 02/13/21 0158  Bladder Assessment  Per Order Details,   Status:  Canceled     Comments: Postpartum 1) Upon Admission to Unit & Every 4 Hours PRN Until Voiding. 2) Out of Bed to Void in 8 Hours.    02/13/21 0157 02/13/21 0158  Straight Cath  Per Order Details,   Status:  Canceled     Comments: Postpartum: If Distended & Unable to Void, May Repeat Once.    02/13/21 0157 02/13/21 0158  Indwelling Urinary Catheter  Per Order Details,   Status:  Canceled     Comments: Postpartum : After Straight Cathed x2 or if Greater Than 1000mL Residual, Insert Indwelling Urinary Catheter Until Further MD Order.    02/13/21 0157 02/13/21 0158  Remove Vaginal Packing  Once,   Status:  Canceled      02/13/21 0157 02/13/21 0158  Breast pump to bed  Once,   Status:  Canceled      02/13/21 0157 02/13/21 0158  If indicated -- Please administer RH Immunoglobulin based on results of cord blood evaluation and fetal screen lab tests, pharmacy to dispense  Per Order Details,   Status:  Canceled     Comments: See Process Instructions For Reference Range Details.     02/13/21 0157 02/13/21 0157  Ambulate Patient  Every Shift,   Status:  Canceled      02/13/21 0157 02/13/21 0157  Apply Ice to Perineum  As Needed,   Status:  Canceled     Comments: For 20 min q 2 hrs    02/13/21 0157 02/13/21 0157  Warm compress  As Needed,   Status:  Canceled      02/13/21 0157 02/13/21 0157  Apply ace wrap, tight bra, or binder  As Needed,   Status:  Canceled      02/13/21 0157 02/13/21 0157  Apply ice packs  As Needed,   Status:  Canceled      02/13/21 0157 02/13/21 0157  sodium chloride 0.9 % flush 1-10 mL  As Needed,   Status:  Discontinued      02/13/21 0157 02/13/21 0157  oxyCODONE (ROXICODONE) immediate release tablet 5 mg  Every 4 Hours PRN,   Status:  Discontinued      02/13/21 0157 02/13/21 0157  magnesium hydroxide (MILK OF MAGNESIA) suspension 2400 mg/10mL 10 mL  Daily PRN,   Status:  Discontinued      02/13/21 0157 02/13/21 0157  lanolin cream  Every 1 Hour PRN,   Status:  Discontinued      02/13/21 0157 02/13/21 0157  benzocaine-menthol (DERMOPLAST) 20-0.5 % topical spray  As Needed,   Status:  Discontinued      02/13/21 0157 02/13/21 0157  witch hazel-glycerin (TUCKS) pad 1 pad  As Needed,   Status:  Discontinued      02/13/21 0157 02/13/21 0157  Hydrocortisone (Perianal) (ANUSOL-HC) 2.5 % rectal cream 1 application  As Needed,   Status:  Discontinued      02/13/21 0157 02/13/21 0157  benzocaine (AMERICAINE) 20 % rectal ointment 1 application  As Needed,   Status:  Discontinued      02/13/21 0157 02/13/21 0157  promethazine (PHENERGAN) tablet 25 mg  Once As Needed,   Status:  Discontinued      02/13/21 0157 02/13/21 0157  promethazine (PHENERGAN) suppository 12.5 mg  Once As Needed,   Status:  Discontinued      02/13/21 0157 02/13/21 0157  metoclopramide (REGLAN) tablet 10 mg  Once As Needed,   Status:  Discontinued      02/13/21 0157 02/13/21 0030  oxytocin in sodium chloride (PITOCIN) 30 UNIT/500ML infusion solution  Once       02/12/21 2343 02/12/21 2348  VTE Prophylaxis Not Indicated: No Risk Factors (0); </= 3 (Low Risk)  Once      02/12/21 2348 02/12/21 2344  Notify Physician (specified)  Until Discontinued,   Status:  Canceled      02/12/21 2343 02/12/21 2344  Vital Signs Per hospital policy  Per Hospital Policy,   Status:  Canceled      02/12/21 2343    02/12/21 2344  Up Ad Edith  Until Discontinued,   Status:  Canceled      02/12/21 2343 02/12/21 2344  Fundal & Lochia Check  Per Order Details,   Status:  Canceled     Comments: Every 15 Minutes x4, Then Every 30 Minutes x2, Then Every Shift    02/12/21 2343 02/12/21 2344  Fundal & Lochia Check  Every Shift,   Status:  Canceled      02/12/21 2343 02/12/21 2344  Diet Regular  Diet Effective Now,   Status:  Canceled      02/12/21 2343 02/12/21 2344  If indicated -- Please administer RH Immunoglobulin based on results of cord blood evaluation and fetal screen lab tests, pharmacy to dispense  Per Order Details,   Status:  Canceled     Comments: See Process Instructions For Reference Range Details.    02/12/21 2343 02/12/21 2344  Nurse May Remove Epidural Catheter After Delivery  Continuous,   Status:  Canceled      02/12/21 2343 02/12/21 2344  Transfer to postpartum when discharge criteria met.  Until Discontinued,   Status:  Canceled      02/12/21 2343 02/12/21 2343  oxytocin in sodium chloride (PITOCIN) 30 UNIT/500ML infusion solution  Once,   Status:  Discontinued      02/12/21 2343 02/12/21 2343  acetaminophen (TYLENOL) tablet 650 mg  Every 4 Hours PRN,   Status:  Discontinued      02/12/21 2343 02/12/21 2343  ibuprofen (ADVIL,MOTRIN) tablet 600 mg  Every 6 Hours PRN,   Status:  Discontinued      02/12/21 2343 02/12/21 2343  HYDROcodone-acetaminophen (NORCO) 5-325 MG per tablet 1 tablet  Every 4 Hours PRN,   Status:  Discontinued      02/12/21 2343 02/12/21 2343  methylergonovine (METHERGINE) injection 200 mcg  Once As Needed,   Status:   Discontinued      02/12/21 2343    02/12/21 2343  carboprost (HEMABATE) injection 250 mcg  As Needed,   Status:  Discontinued      02/12/21 2343    02/12/21 2343  miSOPROStol (CYTOTEC) tablet 800 mcg  As Needed,   Status:  Discontinued      02/12/21 2343    02/12/21 2343  ondansetron (ZOFRAN) tablet 4 mg  Every 6 Hours PRN,   Status:  Discontinued      02/12/21 2343    02/12/21 2343  ondansetron (ZOFRAN) injection 4 mg  Every 6 Hours PRN,   Status:  Discontinued      02/12/21 2343    02/12/21 2343  promethazine (PHENERGAN) suppository 12.5 mg  Every 6 Hours PRN,   Status:  Discontinued      02/12/21 2343 02/12/21 2343  promethazine (PHENERGAN) tablet 12.5 mg  Every 6 Hours PRN,   Status:  Discontinued      02/12/21 2343 02/12/21 2130  oxytocin in sodium chloride (PITOCIN) 30 UNIT/500ML infusion solution  Titrated,   Status:  Discontinued      02/12/21 2043    02/12/21 2100  sodium chloride 0.9 % flush 3 mL  Every 12 Hours Scheduled,   Status:  Discontinued      02/12/21 1437    02/12/21 1900  ropivacaine (NAROPIN) 0.2 % injection  Continuous,   Status:  Discontinued      02/12/21 1813 02/12/21 1900  Sod Citrate-Citric Acid (BICITRA) solution 30 mL  Once,   Status:  Discontinued      02/12/21 1813 02/12/21 1814  Vital Signs Per Anesthesia Guidelines  Per Hospital Policy,   Status:  Canceled     Comments: Every 3 Minutes x 20 Minutes following epidural dosing, then if stable every 15 Minutes    02/12/21 1813 02/12/21 1814  Start IV (16 or 18 Gauge)  Once,   Status:  Canceled      02/12/21 1813 02/12/21 1814  Fetal Heart Rate Monitor  Once,   Status:  Canceled      02/12/21 1813 02/12/21 1814  Nurse or Anesthesiologist to Remain With Patient for 15 Minutes Following Dosing  Continuous,   Status:  Canceled      02/12/21 1813 02/12/21 1814  Facilitate Maternal Position on Side & Maintain Uterine Displacement  Continuous,   Status:  Canceled      02/12/21 1813 02/12/21 1814  Consult Anesthesia  Prior to Changing Epidural Infusion / Rate  Continuous,   Status:  Canceled      02/12/21 1813 02/12/21 1813  lactated ringers bolus 1,000 mL  As Needed      02/12/21 1813 02/12/21 1813  ePHEDrine Sulfate 5 MG/ML injection 10 mg  Every 10 Minutes PRN,   Status:  Discontinued      02/12/21 1813 02/12/21 1813  diphenhydrAMINE (BENADRYL) injection 12.5 mg  Every 8 Hours PRN,   Status:  Discontinued      02/12/21 1813 02/12/21 1813  metoclopramide (REGLAN) injection 10 mg  Once As Needed,   Status:  Discontinued      02/12/21 1813 02/12/21 1813  ondansetron (ZOFRAN) injection 4 mg  Once As Needed,   Status:  Discontinued      02/12/21 1813 02/12/21 1813  famotidine (PEPCID) injection 20 mg  Once As Needed,   Status:  Discontinued      02/12/21 1813 02/12/21 1654  Antibody Identification  Once      02/12/21 1653    02/12/21 1530  lactated ringers bolus 1,500 mL  Once,   Status:  Discontinued      02/12/21 1437    02/12/21 1530  lactated ringers infusion  Continuous,   Status:  Discontinued      02/12/21 1437 02/12/21 1530  mineral oil liquid 30 mL  Once,   Status:  Discontinued      02/12/21 1437 02/12/21 1436  Sod Citrate-Citric Acid (BICITRA) solution 30 mL  Once As Needed,   Status:  Discontinued      02/12/21 1437 02/12/21 1436  lidocaine PF 1% (XYLOCAINE) injection 5 mL  As Needed,   Status:  Discontinued      02/12/21 1437    02/12/21 1436  sodium chloride 0.9 % flush 1-10 mL  As Needed,   Status:  Discontinued      02/12/21 1437    02/12/21 1436  acetaminophen (TYLENOL) tablet 650 mg  Every 4 Hours PRN,   Status:  Discontinued      02/12/21 1437    02/12/21 1436  butorphanol (STADOL) injection 1 mg  Every 2 Hours PRN,   Status:  Discontinued      02/12/21 1437    02/12/21 1436  butorphanol (STADOL) injection 2 mg  Every 2 Hours PRN,   Status:  Discontinued      02/12/21 1437    02/12/21 1436  ondansetron (ZOFRAN) tablet 4 mg  Every 6 Hours PRN,   Status:  Discontinued      02/12/21  1437    21 1436  ondansetron (ZOFRAN) injection 4 mg  Every 6 Hours PRN,   Status:  Discontinued      21 1437    21 1436  promethazine (PHENERGAN) suppository 12.5 mg  Every 6 Hours PRN,   Status:  Discontinued      21 1437    21 1436  promethazine (PHENERGAN) tablet 12.5 mg  Every 6 Hours PRN,   Status:  Discontinued      21 1437    21 1436  terbutaline (BRETHINE) injection 0.25 mg  As Needed,   Status:  Discontinued      21 1437    21 1436  Position change  As Needed,   Status:  Canceled     Comments: For intra-uterine resuscitation for hypertonus, hypertstimulation, or non-reassuring fetal status    21 143                   Operative/Procedure Notes (last 72 hours) (Notes from 21 1553 through 02/15/21 1553)      Manny Carpio MD at 21 [phone]          [facility]  Vaginal Delivery Note    Delivery     Delivery: Vaginal, Spontaneous     YOB: 2019    Time of Birth:  Gestational Age 11:21 PM   38w0d     Anesthesia:   Epidural   Delivering clinician: Manny Carpio    Forceps?   No   Vacuum? No    Shoulder dystocia present: No        Delivery narrative:  The patient delivered a viable male infant by  without complications. Suction performed at delivery and the infant placed on maternal chest. Cord milking and delayed cord clamping performed. Pitocin given IV and the placenta delivered with gentle downward traction. A second degree laceration repaired with 2.0 vicryl. Good hemostasis noted.     Infant    Findings: male  infant     Infant observations: Weight: 3540 g (7 lb 12.9 oz)   Length: 19.25  in  Observations/Comments:        Apgars: 8  @ 1 minute /    9  @ 5 minutes   Infant Name: John     Placenta, Cord, and Fluid    Placenta delivered  Spontaneous  at   2021 11:24 PM     Cord: 3 vessels  present.   Nuchal Cord?  no   Cord blood obtained: Yes    Cord gases obtained:  No    Cord gas results: Venous:  No  results found for: PHCVEN    Arterial:  No results found for: PHCART     Repair    Episiotomy: Not recorded     No    Lacerations: Yes  Laceration Information  Laceration Repaired?   Perineal:  2nd degree  yes   Periurethral:       Labial:       Sulcus:       Vaginal:       Cervical:         Suture used for repair: 2-0 Vicryl   Estimated Blood Loss:  200ml           Complications  none    Disposition  Mother to Mother Baby/Postpartum  in stable condition currently.  Baby to NBN  in stable condition currently.      Manny Carpio MD  21  23:42 EST        Electronically signed by Manny Carpio MD at 21 0942          Physician Progress Notes (last 72 hours) (Notes from 21 1553 through 02/15/21 1553)      Manny Carpio MD at 21 0942          Postpartum Progress Note    Patient name: Meaghan Erazo  YOB: 1985   MRN: [id number]  Referring Provider: Carlos Lund MD  Admission Date: 2021  Date of Service: 2021    ID: 35 y.o.     Diagnosis:   S/p vaginal delivery   Patient Active Problem List   Diagnosis   • Maternal care for breech presentation, single gestation   • 34 weeks gestation of pregnancy   • History of premature delivery   • Pregnancy       Subjective:      No complaints.  Moderate lochia.  Ambulating, voiding, tolerating diet.  Pain well controlled.  The patient is currently breastfeeding.   This baby is a boy and they desire circumcision.    Objective:      Vital signs:  Vital Signs Range for the last 24 hours  Temperature: Temp:  [97.7 °F (36.5 °C)-99 °F (37.2 °C)] 98.2 °F (36.8 °C)   Temp Source: Temp src: Oral   BP: BP: ()/(50-81) 115/56   Pulse: Heart Rate:  [] 92   Respirations: Resp:  [16-18] 16   Weight: 77.1 kg (170 lb)     General: Alert & oriented x4, in no apparent distress  Abdomen: soft, nontender  Uterus: firm, nontender  Extremities: nontender; no edema      Labs:  Lab Results   Component Value  Date    WBC 10.47 02/12/2021    HGB 11.5 (L) 02/12/2021    HCT 35.7 02/12/2021    MCV 93.2 02/12/2021     02/12/2021     Results from last 7 days   Lab Units 02/12/21  1634   ABO TYPING  A   RH TYPING  Negative     External Prenatal Results     Pregnancy Outside Results - Transcribed From Office Records - See Scanned Records For Details     Test Value Date Time    Hgb 11.5 g/dL 02/12/21 1342      11.2 g/dL 12/01/20     Hct 35.7 % 02/12/21 1342      32.9 % 12/01/20     ABO A  02/12/21 1634    Rh Negative  02/12/21 1634    Antibody Screen Positive  02/12/21 1429      Positive  12/01/20       See Final Results  12/01/20     Glucose Fasting GTT       Glucose Tolerance Test 1 hour       Glucose Tolerance Test 3 hour       Gonorrhea (discrete)       Chlamydia (discrete)       RPR       VDRL       Syphilis Antibody       Rubella       HBsAg       Herpes Simplex Virus PCR       Herpes Simplex VIrus Culture       HIV       Hep C RNA Quant PCR       Hep C Antibody       AFP       Group B Strep No Group B Streptococcus isolated  02/02/21 2144    GBS Susceptibility to Clindamycin       GBS Susceptibility to Erythromycin       Fetal Fibronectin       Genetic Testing, Maternal Blood             Drug Screening     Test Value Date Time    Urine Drug Screen       Amphetamine Screen       Barbiturate Screen       Benzodiazepine Screen       Methadone Screen       Phencyclidine Screen       Opiates Screen       THC Screen       Cocaine Screen       Propoxyphene Screen       Buprenorphine Screen       Methamphetamine Screen       Oxycodone Screen       Tricyclic Antidepressants Screen                   Assessment/Plan:      PPD#1 s/p vaginal delivery.  1. S/p vaginal delivery: Doing well.Continue routine postpartum care.    2. Infant feeding: Supportive care.  The patient is currently breastfeeding.  3. Contraception: Education given regarding options for contraception, including barrier methods, injectable contraception, IUD  placement, oral contraceptives.    Manny Carpio MD  21      Electronically signed by Manny Carpio MD at 21 [id number]          Discharge Summary      Manny Carpio MD at 21 4240          Discharge Summary    Date of Admission: 2021  Date of Discharge:  2021      Patient: Meaghan Erazo      MR#:[id number]    Delivery Provider: Manny Carpio     Discharge Surgeon/OB: Dr. Manny Carpio    Presenting Problem/History of Present Illness  Pregnancy [Z34.90]     Patient Active Problem List    Diagnosis   • Pregnancy [Z34.90]   • History of premature delivery [Z87.51]   • 34 weeks gestation of pregnancy [Z3A.34]   • Maternal care for breech presentation, single gestation [O32.1XX0]         Discharge Diagnosis: Vaginal delivery at 38w0d    Procedures:  Vaginal, Spontaneous     2021    11:21 PM        Discharge Date: 2021;     Hospital Course  Patient is a 35 y.o. female  at 38w0d status post vaginal delivery without complication. Postpartum the patient did well. She remained afebrile, with vital signs stable. She was ready for discharge on postpartum day 2.     Infant:   male  fetus 3540 g (7 lb 12.9 oz)  with Apgar scores of 8 , 9  at five minutes.    Condition on Discharge:  Stable    Vital Signs  Temp:  [97.9 °F (36.6 °C)-98.7 °F (37.1 °C)] 98.1 °F (36.7 °C)  Heart Rate:  [70-78] 77  Resp:  [16-18] 18  BP: (106-114)/(60-67) 109/61    Lab Results   Component Value Date    WBC 8.67 2021    HGB 10.8 (L) 2021    HCT 33.9 (L) 2021    MCV 96.0 2021     2021       Discharge Disposition  Home or Self Care    Discharge Medications     Discharge Medications      New Medications      Instructions Start Date   ibuprofen 600 MG tablet  Commonly known as: ADVIL,MOTRIN   600 mg, Oral, Every 6 Hours PRN         Continue These Medications      Instructions Start Date   prenatal vitamin 27-0.8 27-0.8 MG tablet tablet   Oral,  Daily             Discharge Diet:   regular  Activity at Discharge:   Ad dallin  Follow-up Appointments  Future Appointments   Date Time Provider Department Center   2/16/2021  3:50 PM Carlos Lund MD [facility]         Manny Carpio MD  02/14/21  12:29 EST  [facility]          Electronically signed by Manny Carpio MD at 02/14/21 0055